# Patient Record
Sex: FEMALE | Race: WHITE | NOT HISPANIC OR LATINO | Employment: OTHER | URBAN - METROPOLITAN AREA
[De-identification: names, ages, dates, MRNs, and addresses within clinical notes are randomized per-mention and may not be internally consistent; named-entity substitution may affect disease eponyms.]

---

## 2023-11-30 ENCOUNTER — OFFICE VISIT (OUTPATIENT)
Dept: PRIMARY CARE | Facility: CLINIC | Age: 88
End: 2023-11-30
Payer: MEDICARE

## 2023-11-30 VITALS
SYSTOLIC BLOOD PRESSURE: 108 MMHG | RESPIRATION RATE: 18 BRPM | HEIGHT: 60 IN | DIASTOLIC BLOOD PRESSURE: 76 MMHG | BODY MASS INDEX: 22.97 KG/M2 | WEIGHT: 117 LBS

## 2023-11-30 DIAGNOSIS — Z00.00 ANNUAL PHYSICAL EXAM: ICD-10-CM

## 2023-11-30 DIAGNOSIS — H61.23 HEARING LOSS OF BOTH EARS DUE TO CERUMEN IMPACTION: Primary | ICD-10-CM

## 2023-11-30 DIAGNOSIS — Z23 FLU VACCINE NEED: ICD-10-CM

## 2023-11-30 DIAGNOSIS — R54 ADVANCED AGE: ICD-10-CM

## 2023-11-30 DIAGNOSIS — H65.93 BILATERAL NON-SUPPURATIVE OTITIS MEDIA: ICD-10-CM

## 2023-11-30 PROBLEM — S81.802A WOUND OF LEFT LEG: Status: ACTIVE | Noted: 2023-11-30

## 2023-11-30 PROBLEM — S69.91XA WRIST INJURY, RIGHT, INITIAL ENCOUNTER: Status: ACTIVE | Noted: 2023-11-30

## 2023-11-30 PROBLEM — R68.89 FORGETFULNESS: Status: ACTIVE | Noted: 2023-11-30

## 2023-11-30 PROCEDURE — 69210 REMOVE IMPACTED EAR WAX UNI: CPT | Performed by: INTERNAL MEDICINE

## 2023-11-30 PROCEDURE — 99214 OFFICE O/P EST MOD 30 MIN: CPT | Performed by: INTERNAL MEDICINE

## 2023-11-30 RX ORDER — LEVOTHYROXINE SODIUM 75 UG/1
TABLET ORAL
COMMUNITY
Start: 2022-06-29

## 2023-11-30 RX ORDER — AMOXICILLIN AND CLAVULANATE POTASSIUM 875; 125 MG/1; MG/1
875 TABLET, FILM COATED ORAL 2 TIMES DAILY
Qty: 14 TABLET | Refills: 0 | Status: SHIPPED | OUTPATIENT
Start: 2023-11-30 | End: 2023-12-07

## 2023-11-30 RX ORDER — LIDOCAINE 50 MG/G
1 PATCH TOPICAL DAILY
COMMUNITY
Start: 2022-02-11 | End: 2024-01-11 | Stop reason: SDUPTHER

## 2023-11-30 RX ORDER — ACETAMINOPHEN 325 MG/1
TABLET ORAL EVERY 6 HOURS PRN
COMMUNITY
Start: 2022-02-11

## 2023-11-30 ASSESSMENT — PATIENT HEALTH QUESTIONNAIRE - PHQ9
1. LITTLE INTEREST OR PLEASURE IN DOING THINGS: NOT AT ALL
2. FEELING DOWN, DEPRESSED OR HOPELESS: NOT AT ALL
SUM OF ALL RESPONSES TO PHQ9 QUESTIONS 1 & 2: 0

## 2023-11-30 ASSESSMENT — ENCOUNTER SYMPTOMS
LOSS OF SENSATION IN FEET: 1
OCCASIONAL FEELINGS OF UNSTEADINESS: 1

## 2023-12-22 NOTE — PROGRESS NOTES
Subjective   Tracey Viera is a 89 y.o. female who presents for Cerumen Impaction (EAR FLUSH ).  Patient presents for cerumen impaction.  Her ears were flushed in the office today.  She tolerated this well.  No other acute issues or complaints.        Objective     /76 (BP Location: Right arm, Patient Position: Sitting, BP Cuff Size: Adult)   Resp 18   Ht 1.524 m (5')   Wt 53.1 kg (117 lb)   BMI 22.85 kg/m²      Physical Exam  Constitutional:       Comments: Frail elderly female   HENT:      Head: Normocephalic and atraumatic.      Nose: Nose normal.      Mouth/Throat:      Mouth: Mucous membranes are moist.      Pharynx: Oropharynx is clear.   Eyes:      Extraocular Movements: Extraocular movements intact.      Pupils: Pupils are equal, round, and reactive to light.   Cardiovascular:      Rate and Rhythm: Normal rate and regular rhythm.   Pulmonary:      Effort: No respiratory distress.      Breath sounds: Normal breath sounds. No wheezing, rhonchi or rales.   Abdominal:      General: Bowel sounds are normal. There is no distension.      Palpations: Abdomen is soft.      Tenderness: There is no abdominal tenderness. There is no guarding.   Musculoskeletal:      Right lower leg: No edema.      Left lower leg: No edema.   Skin:     General: Skin is warm and dry.   Neurological:      Mental Status: She is alert and oriented to person, place, and time. Mental status is at baseline.   Psychiatric:         Mood and Affect: Mood normal.         Behavior: Behavior normal.         Assessment/Plan   Problem List Items Addressed This Visit       Advanced age     Other Visit Diagnoses       Hearing loss of both ears due to cerumen impaction    -  Primary    Relevant Medications    carbamide peroxide (Debrox) 6.5 % otic solution    Annual physical exam        Relevant Orders    Lipid panel    TSH    Flu vaccine need        Bilateral non-suppurative otitis media              Debrox as needed  Augmentin for otitis  media       Francisco Washington, DO

## 2024-01-11 ENCOUNTER — HOME HEALTH ADMISSION (OUTPATIENT)
Dept: HOME HEALTH SERVICES | Facility: HOME HEALTH | Age: 89
End: 2024-01-11
Payer: MEDICARE

## 2024-01-11 ENCOUNTER — OFFICE VISIT (OUTPATIENT)
Dept: PRIMARY CARE | Facility: CLINIC | Age: 89
End: 2024-01-11
Payer: MEDICARE

## 2024-01-11 VITALS
WEIGHT: 123 LBS | RESPIRATION RATE: 18 BRPM | DIASTOLIC BLOOD PRESSURE: 80 MMHG | HEART RATE: 74 BPM | HEIGHT: 60 IN | OXYGEN SATURATION: 95 % | SYSTOLIC BLOOD PRESSURE: 124 MMHG | BODY MASS INDEX: 24.15 KG/M2

## 2024-01-11 DIAGNOSIS — I10 ESSENTIAL HYPERTENSION: ICD-10-CM

## 2024-01-11 DIAGNOSIS — M76.32 ILIOTIBIAL BAND SYNDROME OF LEFT SIDE: Primary | ICD-10-CM

## 2024-01-11 PROBLEM — C67.9 BLADDER CANCER (MULTI): Status: ACTIVE | Noted: 2019-06-10

## 2024-01-11 PROBLEM — S52.022A CLOSED FRACTURE OF LEFT OLECRANON PROCESS: Status: ACTIVE | Noted: 2023-06-17

## 2024-01-11 PROBLEM — E78.5 HYPERLIPIDEMIA LDL GOAL <100: Status: ACTIVE | Noted: 2024-01-11

## 2024-01-11 PROBLEM — I35.0 NONRHEUMATIC AORTIC VALVE STENOSIS: Status: ACTIVE | Noted: 2019-06-10

## 2024-01-11 PROBLEM — R53.1 WEAKNESS: Status: ACTIVE | Noted: 2018-08-21

## 2024-01-11 PROBLEM — K57.30 DIVERTICULOSIS OF COLON: Status: ACTIVE | Noted: 2024-01-11

## 2024-01-11 PROBLEM — I35.1 MILD AORTIC INSUFFICIENCY: Status: ACTIVE | Noted: 2024-01-11

## 2024-01-11 PROBLEM — L57.0 AK (ACTINIC KERATOSIS): Status: ACTIVE | Noted: 2024-01-11

## 2024-01-11 PROBLEM — F17.210 CIGARETTE SMOKER ONE HALF PACK A DAY OR LESS: Status: ACTIVE | Noted: 2024-01-11

## 2024-01-11 PROBLEM — R26.89 BALANCE PROBLEM: Status: ACTIVE | Noted: 2018-08-21

## 2024-01-11 PROCEDURE — 3074F SYST BP LT 130 MM HG: CPT | Performed by: INTERNAL MEDICINE

## 2024-01-11 PROCEDURE — 1159F MED LIST DOCD IN RCRD: CPT | Performed by: INTERNAL MEDICINE

## 2024-01-11 PROCEDURE — 1125F AMNT PAIN NOTED PAIN PRSNT: CPT | Performed by: INTERNAL MEDICINE

## 2024-01-11 PROCEDURE — 99214 OFFICE O/P EST MOD 30 MIN: CPT | Performed by: INTERNAL MEDICINE

## 2024-01-11 PROCEDURE — 3079F DIAST BP 80-89 MM HG: CPT | Performed by: INTERNAL MEDICINE

## 2024-01-11 RX ORDER — CEFDINIR 300 MG/1
300 CAPSULE ORAL EVERY 12 HOURS
COMMUNITY
Start: 2024-01-07

## 2024-01-11 RX ORDER — IPRATROPIUM BROMIDE AND ALBUTEROL SULFATE 2.5; .5 MG/3ML; MG/3ML
SOLUTION RESPIRATORY (INHALATION)
COMMUNITY
Start: 2023-12-30

## 2024-01-11 RX ORDER — MULTIVITAMIN WITH IRON
TABLET ORAL
COMMUNITY
Start: 2005-03-01

## 2024-01-11 RX ORDER — OMEPRAZOLE 40 MG/1
40 CAPSULE, DELAYED RELEASE ORAL
COMMUNITY
Start: 2021-07-23

## 2024-01-11 RX ORDER — OLIVE OIL
OIL (ML) MISCELLANEOUS
COMMUNITY
Start: 2023-12-31

## 2024-01-11 RX ORDER — LIDOCAINE 50 MG/G
1 PATCH TOPICAL DAILY
Qty: 30 PATCH | Refills: 3 | Status: SHIPPED | OUTPATIENT
Start: 2024-01-11 | End: 2024-01-16 | Stop reason: SDUPTHER

## 2024-01-11 RX ORDER — FOSINOPRIL SODIUM 20 MG/1
20 TABLET ORAL
COMMUNITY
Start: 2021-02-08

## 2024-01-11 ASSESSMENT — PAIN SCALES - GENERAL: PAINLEVEL: 2

## 2024-01-11 NOTE — PROGRESS NOTES
Subjective   Tracey Viera is a 89 y.o. female who presents for Follow-up.  Patient presents for follow-up of pneumonia.  She presented to urgent care with fever around Sh[place] time and was diagnosed with pneumonia.  She received IV Rocephin on back-to-back days and was prescribed p.o. antibiotics.  She still has a few pills left.  Today, she denies cough, fever, chills.  Patient accompanied by friend she states that patient's hearing has been better since her ears were flushed.  Ears examined again today.  Patient does have cerumen bilaterally but no difficulty hearing below baseline.  Patient does complain of pain and weakness on her lateral left quad.  She points down her IT band.  Is tight on exam.  Advised that she would benefit from physical therapy.  Patient is unable to get out of her house due to age, debility, and ambulatory dysfunction.  She would benefit from home health care for physical therapy.        Objective     /80 (BP Location: Right arm, Patient Position: Sitting, BP Cuff Size: Adult)   Pulse 74   Resp 18   Ht 1.524 m (5')   Wt 55.8 kg (123 lb)   SpO2 95%   BMI 24.02 kg/m²      Physical Exam  Constitutional:       Comments: Frail elderly female, hard of hearing   HENT:      Head: Normocephalic and atraumatic.      Nose: Nose normal.      Mouth/Throat:      Mouth: Mucous membranes are moist.      Pharynx: Oropharynx is clear.   Eyes:      Extraocular Movements: Extraocular movements intact.      Pupils: Pupils are equal, round, and reactive to light.   Cardiovascular:      Rate and Rhythm: Normal rate and regular rhythm.   Pulmonary:      Effort: No respiratory distress.      Breath sounds: Normal breath sounds. No wheezing, rhonchi or rales.   Abdominal:      General: Bowel sounds are normal. There is no distension.      Palpations: Abdomen is soft.      Tenderness: There is no abdominal tenderness. There is no guarding.   Musculoskeletal:      Right lower leg: No edema.       Left lower leg: No edema.      Comments: Mild tenderness along the left IT band   Skin:     General: Skin is warm and dry.   Neurological:      Mental Status: She is alert and oriented to person, place, and time. Mental status is at baseline.   Psychiatric:         Mood and Affect: Mood normal.         Behavior: Behavior normal.         Assessment/Plan   Problem List Items Addressed This Visit       Essential hypertension     Continue current medications  Blood pressure at goal          Other Visit Diagnoses       Iliotibial band syndrome of left side    -  Primary    Relevant Medications    lidocaine (Lidoderm) 5 % patch    Other Relevant Orders    Referral to Home Care          Complete antibiotic course  Lidoderm patch and physical therapy for IT band syndrome  Return for an ear flush in the near future       Francisco Washington, DO

## 2024-01-12 ENCOUNTER — DOCUMENTATION (OUTPATIENT)
Dept: HOME HEALTH SERVICES | Facility: HOME HEALTH | Age: 89
End: 2024-01-12
Payer: MEDICARE

## 2024-01-12 NOTE — HH CARE COORDINATION
Home Care received a Referral for Physical Therapy and Occupational Therapy. We have processed the referral for a Start of Care on 1/12/24 1-2 days.     If you have any questions or concerns, please feel free to contact us at [phone]. Follow the prompts, enter your five digit zip code, and you will be directed to your care team on [facility].

## 2024-01-15 ENCOUNTER — HOME CARE VISIT (OUTPATIENT)
Dept: HOME HEALTH SERVICES | Facility: HOME HEALTH | Age: 89
End: 2024-01-15
Payer: MEDICARE

## 2024-01-15 PROCEDURE — 169592 NO-PAY CLAIM PROCEDURE

## 2024-01-15 PROCEDURE — 1090000002 HH PPS REVENUE DEBIT

## 2024-01-15 PROCEDURE — 0023 HH SOC

## 2024-01-15 PROCEDURE — 1090000001 HH PPS REVENUE CREDIT

## 2024-01-15 PROCEDURE — G0151 HHCP-SERV OF PT,EA 15 MIN: HCPCS | Mod: HHH

## 2024-01-15 ASSESSMENT — ENCOUNTER SYMPTOMS
LOWEST PAIN SEVERITY IN PAST 24 HOURS: 0/10
SUBJECTIVE PAIN PROGRESSION: WAXING AND WANING
PAIN: 1
PAIN SEVERITY GOAL: 0/10
PERSON REPORTING PAIN: PATIENT
HIGHEST PAIN SEVERITY IN PAST 24 HOURS: 6/10
PAIN LOCATION: RIGHT HIP
PAIN LOCATION: LEFT HIP

## 2024-01-15 ASSESSMENT — ACTIVITIES OF DAILY LIVING (ADL)
OASIS_M1830: 03
ENTERING_EXITING_HOME: MINIMUM ASSIST
AMBULATION ASSISTANCE ON FLAT SURFACES: 1

## 2024-01-16 ENCOUNTER — HOME CARE VISIT (OUTPATIENT)
Dept: HOME HEALTH SERVICES | Facility: HOME HEALTH | Age: 89
End: 2024-01-16
Payer: MEDICARE

## 2024-01-16 DIAGNOSIS — M76.32 ILIOTIBIAL BAND SYNDROME OF LEFT SIDE: ICD-10-CM

## 2024-01-16 PROCEDURE — 1090000002 HH PPS REVENUE DEBIT

## 2024-01-16 PROCEDURE — 1090000001 HH PPS REVENUE CREDIT

## 2024-01-16 RX ORDER — LIDOCAINE 50 MG/G
1 PATCH TOPICAL DAILY
Qty: 30 PATCH | Refills: 3 | Status: SHIPPED | OUTPATIENT
Start: 2024-01-16 | End: 2024-01-22 | Stop reason: SDUPTHER

## 2024-01-16 NOTE — HOME HEALTH
PAtient referred for OTHH services by MD due to functional decline. PMH:illiotibial band syndrome, bladder CA, elbow fracture.

## 2024-01-16 NOTE — Clinical Note
showed up to home for schedule visit with POA requesting therapist return another time due to patient still asleep.

## 2024-01-17 ENCOUNTER — TELEPHONE (OUTPATIENT)
Dept: PRIMARY CARE | Facility: CLINIC | Age: 89
End: 2024-01-17
Payer: MEDICARE

## 2024-01-17 ENCOUNTER — HOME CARE VISIT (OUTPATIENT)
Dept: HOME HEALTH SERVICES | Facility: HOME HEALTH | Age: 89
End: 2024-01-17
Payer: MEDICARE

## 2024-01-17 PROCEDURE — 1090000002 HH PPS REVENUE DEBIT

## 2024-01-17 PROCEDURE — 1090000001 HH PPS REVENUE CREDIT

## 2024-01-17 NOTE — TELEPHONE ENCOUNTER
Pt left voicemail concerning needing pre-authorization for prescribed lidocaine patches. Pre- Auth noted to be pending at this time. No questions sent to this office to answer at this time.

## 2024-01-18 ENCOUNTER — HOME CARE VISIT (OUTPATIENT)
Dept: HOME HEALTH SERVICES | Facility: HOME HEALTH | Age: 89
End: 2024-01-18
Payer: MEDICARE

## 2024-01-18 PROCEDURE — 1090000002 HH PPS REVENUE DEBIT

## 2024-01-18 PROCEDURE — G0157 HHC PT ASSISTANT EA 15: HCPCS | Mod: HHH

## 2024-01-18 PROCEDURE — 1090000001 HH PPS REVENUE CREDIT

## 2024-01-18 ASSESSMENT — ENCOUNTER SYMPTOMS
PAIN SEVERITY GOAL: 0/10
PAIN LOCATION: RIGHT LEG
PAIN: 1
HIGHEST PAIN SEVERITY IN PAST 24 HOURS: 5/10
SUBJECTIVE PAIN PROGRESSION: WAXING AND WANING
LOWEST PAIN SEVERITY IN PAST 24 HOURS: 5/10

## 2024-01-19 ENCOUNTER — HOME CARE VISIT (OUTPATIENT)
Dept: HOME HEALTH SERVICES | Facility: HOME HEALTH | Age: 89
End: 2024-01-19
Payer: MEDICARE

## 2024-01-19 PROCEDURE — 1090000002 HH PPS REVENUE DEBIT

## 2024-01-19 PROCEDURE — G0152 HHCP-SERV OF OT,EA 15 MIN: HCPCS | Mod: HHH

## 2024-01-19 PROCEDURE — 1090000001 HH PPS REVENUE CREDIT

## 2024-01-19 ASSESSMENT — ENCOUNTER SYMPTOMS: PERSON REPORTING PAIN: PATIENT

## 2024-01-19 ASSESSMENT — ACTIVITIES OF DAILY LIVING (ADL)
BATHING_CURRENT_FUNCTION: MINIMUM ASSIST
PREPARING MEALS: NEEDS ASSISTANCE
TOILETING: 1
DRESSING_LB_CURRENT_FUNCTION: MINIMUM ASSIST
TOILETING: STAND BY ASSIST
BATHING ASSESSED: 1

## 2024-01-20 PROCEDURE — 1090000001 HH PPS REVENUE CREDIT

## 2024-01-20 PROCEDURE — 1090000002 HH PPS REVENUE DEBIT

## 2024-01-20 NOTE — HOME HEALTH
Patient referred for OTHH services by MD with decreased functional task completion. Patient lives next door to neighbor who checks in on patient/POA. Private HHA to assist as needed during the day 7 days a week. patient previously supervision to min A for functional task completion at walker level. Tub shower on 2nd floor with stool  Patient currently CGA to mod A for functional taks completion with sfety cues.   PMH:illiotibial band syndrome, HTN PAtient to benefit from additional OT services to further address safe task completion

## 2024-01-21 PROCEDURE — 1090000001 HH PPS REVENUE CREDIT

## 2024-01-21 PROCEDURE — 1090000002 HH PPS REVENUE DEBIT

## 2024-01-21 ASSESSMENT — ENCOUNTER SYMPTOMS: PERSON REPORTING PAIN: PATIENT

## 2024-01-22 DIAGNOSIS — M76.32 ILIOTIBIAL BAND SYNDROME OF LEFT SIDE: ICD-10-CM

## 2024-01-22 PROCEDURE — 1090000001 HH PPS REVENUE CREDIT

## 2024-01-22 PROCEDURE — 1090000002 HH PPS REVENUE DEBIT

## 2024-01-23 ENCOUNTER — HOME CARE VISIT (OUTPATIENT)
Dept: HOME HEALTH SERVICES | Facility: HOME HEALTH | Age: 89
End: 2024-01-23
Payer: MEDICARE

## 2024-01-23 PROCEDURE — 1090000002 HH PPS REVENUE DEBIT

## 2024-01-23 PROCEDURE — 1090000001 HH PPS REVENUE CREDIT

## 2024-01-23 PROCEDURE — G0157 HHC PT ASSISTANT EA 15: HCPCS | Mod: HHH

## 2024-01-23 RX ORDER — LIDOCAINE 50 MG/G
1 PATCH TOPICAL DAILY
Qty: 30 PATCH | Refills: 3 | Status: SHIPPED | OUTPATIENT
Start: 2024-01-23

## 2024-01-23 ASSESSMENT — ENCOUNTER SYMPTOMS
DENIES PAIN: 1
PERSON REPORTING PAIN: PATIENT

## 2024-01-24 ENCOUNTER — HOME CARE VISIT (OUTPATIENT)
Dept: HOME HEALTH SERVICES | Facility: HOME HEALTH | Age: 89
End: 2024-01-24
Payer: MEDICARE

## 2024-01-24 VITALS
DIASTOLIC BLOOD PRESSURE: 60 MMHG | TEMPERATURE: 97.1 F | HEART RATE: 56 BPM | OXYGEN SATURATION: 100 % | SYSTOLIC BLOOD PRESSURE: 120 MMHG

## 2024-01-24 PROCEDURE — G0152 HHCP-SERV OF OT,EA 15 MIN: HCPCS | Mod: HHH

## 2024-01-24 PROCEDURE — 1090000001 HH PPS REVENUE CREDIT

## 2024-01-24 PROCEDURE — 1090000002 HH PPS REVENUE DEBIT

## 2024-01-24 ASSESSMENT — ENCOUNTER SYMPTOMS
DENIES PAIN: 1
PERSON REPORTING PAIN: PATIENT

## 2024-01-25 ENCOUNTER — HOME CARE VISIT (OUTPATIENT)
Dept: HOME HEALTH SERVICES | Facility: HOME HEALTH | Age: 89
End: 2024-01-25
Payer: MEDICARE

## 2024-01-25 PROCEDURE — 1090000001 HH PPS REVENUE CREDIT

## 2024-01-25 PROCEDURE — 1090000002 HH PPS REVENUE DEBIT

## 2024-01-25 PROCEDURE — G0157 HHC PT ASSISTANT EA 15: HCPCS | Mod: HHH

## 2024-01-25 ASSESSMENT — ENCOUNTER SYMPTOMS
PERSON REPORTING PAIN: PATIENT
DENIES PAIN: 1

## 2024-01-26 PROCEDURE — 1090000001 HH PPS REVENUE CREDIT

## 2024-01-26 PROCEDURE — 1090000002 HH PPS REVENUE DEBIT

## 2024-01-27 PROCEDURE — 1090000002 HH PPS REVENUE DEBIT

## 2024-01-27 PROCEDURE — 1090000001 HH PPS REVENUE CREDIT

## 2024-01-28 PROCEDURE — 1090000001 HH PPS REVENUE CREDIT

## 2024-01-28 PROCEDURE — 1090000002 HH PPS REVENUE DEBIT

## 2024-01-29 PROCEDURE — 1090000001 HH PPS REVENUE CREDIT

## 2024-01-29 PROCEDURE — 1090000002 HH PPS REVENUE DEBIT

## 2024-01-30 ENCOUNTER — HOME CARE VISIT (OUTPATIENT)
Dept: HOME HEALTH SERVICES | Facility: HOME HEALTH | Age: 89
End: 2024-01-30
Payer: MEDICARE

## 2024-01-30 PROCEDURE — 1090000002 HH PPS REVENUE DEBIT

## 2024-01-30 PROCEDURE — G0157 HHC PT ASSISTANT EA 15: HCPCS | Mod: HHH

## 2024-01-30 PROCEDURE — 1090000001 HH PPS REVENUE CREDIT

## 2024-01-30 ASSESSMENT — ENCOUNTER SYMPTOMS
DENIES PAIN: 1
PERSON REPORTING PAIN: PATIENT

## 2024-01-31 ENCOUNTER — HOME CARE VISIT (OUTPATIENT)
Dept: HOME HEALTH SERVICES | Facility: HOME HEALTH | Age: 89
End: 2024-01-31
Payer: MEDICARE

## 2024-01-31 VITALS
TEMPERATURE: 96.9 F | HEART RATE: 61 BPM | OXYGEN SATURATION: 97 % | SYSTOLIC BLOOD PRESSURE: 120 MMHG | DIASTOLIC BLOOD PRESSURE: 70 MMHG

## 2024-01-31 PROCEDURE — G0152 HHCP-SERV OF OT,EA 15 MIN: HCPCS | Mod: HHH

## 2024-01-31 PROCEDURE — 1090000001 HH PPS REVENUE CREDIT

## 2024-01-31 PROCEDURE — 1090000002 HH PPS REVENUE DEBIT

## 2024-02-01 PROCEDURE — 1090000001 HH PPS REVENUE CREDIT

## 2024-02-01 PROCEDURE — 1090000002 HH PPS REVENUE DEBIT

## 2024-02-02 ENCOUNTER — APPOINTMENT (OUTPATIENT)
Dept: HOME HEALTH SERVICES | Facility: HOME HEALTH | Age: 89
End: 2024-02-02
Payer: MEDICARE

## 2024-02-02 PROCEDURE — 1090000001 HH PPS REVENUE CREDIT

## 2024-02-02 PROCEDURE — 1090000002 HH PPS REVENUE DEBIT

## 2024-02-03 PROCEDURE — 1090000001 HH PPS REVENUE CREDIT

## 2024-02-03 PROCEDURE — 1090000002 HH PPS REVENUE DEBIT

## 2024-02-04 PROCEDURE — 1090000002 HH PPS REVENUE DEBIT

## 2024-02-04 PROCEDURE — 1090000001 HH PPS REVENUE CREDIT

## 2024-02-05 ENCOUNTER — HOME CARE VISIT (OUTPATIENT)
Dept: HOME HEALTH SERVICES | Facility: HOME HEALTH | Age: 89
End: 2024-02-05
Payer: MEDICARE

## 2024-02-05 PROCEDURE — G0151 HHCP-SERV OF PT,EA 15 MIN: HCPCS | Mod: HHH

## 2024-02-05 PROCEDURE — 1090000002 HH PPS REVENUE DEBIT

## 2024-02-05 PROCEDURE — 1090000001 HH PPS REVENUE CREDIT

## 2024-02-05 ASSESSMENT — ENCOUNTER SYMPTOMS
PAIN SEVERITY GOAL: 0/10
HIGHEST PAIN SEVERITY IN PAST 24 HOURS: 0/10
PERSON REPORTING PAIN: PATIENT
SUBJECTIVE PAIN PROGRESSION: RESOLVED
DENIES PAIN: 1
LOWEST PAIN SEVERITY IN PAST 24 HOURS: 0/10

## 2024-02-05 ASSESSMENT — ACTIVITIES OF DAILY LIVING (ADL)
OASIS_M1830: 03
HOME_HEALTH_OASIS: 01

## 2024-04-25 ENCOUNTER — OFFICE VISIT (OUTPATIENT)
Dept: PRIMARY CARE | Facility: CLINIC | Age: 89
End: 2024-04-25
Payer: MEDICARE

## 2024-04-25 VITALS
DIASTOLIC BLOOD PRESSURE: 64 MMHG | RESPIRATION RATE: 18 BRPM | SYSTOLIC BLOOD PRESSURE: 116 MMHG | HEIGHT: 60 IN | WEIGHT: 132 LBS | BODY MASS INDEX: 25.91 KG/M2 | OXYGEN SATURATION: 96 % | HEART RATE: 66 BPM

## 2024-04-25 DIAGNOSIS — R60.0 EDEMA OF BOTH LEGS: Primary | ICD-10-CM

## 2024-04-25 DIAGNOSIS — B37.89 CANDIDA RASH OF GROIN: ICD-10-CM

## 2024-04-25 DIAGNOSIS — R54 ADVANCED AGE: ICD-10-CM

## 2024-04-25 DIAGNOSIS — I10 ESSENTIAL HYPERTENSION: ICD-10-CM

## 2024-04-25 PROCEDURE — 1125F AMNT PAIN NOTED PAIN PRSNT: CPT | Performed by: INTERNAL MEDICINE

## 2024-04-25 PROCEDURE — 99214 OFFICE O/P EST MOD 30 MIN: CPT | Performed by: INTERNAL MEDICINE

## 2024-04-25 PROCEDURE — 1159F MED LIST DOCD IN RCRD: CPT | Performed by: INTERNAL MEDICINE

## 2024-04-25 PROCEDURE — 3078F DIAST BP <80 MM HG: CPT | Performed by: INTERNAL MEDICINE

## 2024-04-25 PROCEDURE — 3074F SYST BP LT 130 MM HG: CPT | Performed by: INTERNAL MEDICINE

## 2024-04-25 RX ORDER — FUROSEMIDE 20 MG/1
10 TABLET ORAL DAILY PRN
Qty: 15 TABLET | Refills: 0 | Status: SHIPPED | OUTPATIENT
Start: 2024-04-25 | End: 2024-05-03 | Stop reason: SDUPTHER

## 2024-04-25 RX ORDER — NYSTATIN 100000 [USP'U]/G
1 POWDER TOPICAL 2 TIMES DAILY
Qty: 60 G | Refills: 1 | Status: SHIPPED | OUTPATIENT
Start: 2024-04-25 | End: 2025-04-25

## 2024-04-25 ASSESSMENT — PAIN SCALES - GENERAL: PAINLEVEL: 4

## 2024-04-25 NOTE — PROGRESS NOTES
Subjective   Patient ID: Tracey Viera is a 89 y.o. female who presents for No chief complaint on file..    Patient presents for multiple complaints.  Accompanied by family member.  She complains of swelling in her legs and feet.  She has been on diuretics in the past but is not on one now.  She does not wear compression stockings due to lower extremity pain and thin skin.  Patient has a home health aide who told patient to get nystatin for intertrigo.  Family member inquires about vitamins.  Family member does not think that patient needs vitamin supplements.  Advised that patient does not need supplements if she has a balanced diet.             Objective   /64 (BP Location: Left arm, Patient Position: Sitting, BP Cuff Size: Adult)   Pulse 66   Resp 18   Ht 1.524 m (5')   Wt 59.9 kg (132 lb)   SpO2 96%   BMI 25.78 kg/m²     Physical Exam  Constitutional:       Comments: Frail elderly female, hard of hearing   HENT:      Head: Normocephalic and atraumatic.      Nose: Nose normal.      Mouth/Throat:      Mouth: Mucous membranes are moist.      Pharynx: Oropharynx is clear.   Eyes:      Extraocular Movements: Extraocular movements intact.      Pupils: Pupils are equal, round, and reactive to light.   Cardiovascular:      Rate and Rhythm: Normal rate and regular rhythm.   Pulmonary:      Effort: No respiratory distress.      Breath sounds: Normal breath sounds. No wheezing, rhonchi or rales.   Abdominal:      General: Bowel sounds are normal. There is no distension.      Palpations: Abdomen is soft.      Tenderness: There is no abdominal tenderness. There is no guarding.   Musculoskeletal:      Right lower leg: Edema present.      Left lower leg: Edema present.   Skin:     General: Skin is warm and dry.   Neurological:      Mental Status: She is alert and oriented to person, place, and time. Mental status is at baseline.   Psychiatric:         Mood and Affect: Mood normal.         Behavior: Behavior  normal.         Assessment/Plan   Problem List Items Addressed This Visit             ICD-10-CM    Advanced age R54     Conservative management         Essential hypertension I10     Continue current medications          Other Visit Diagnoses         Codes    Edema of both legs    -  Primary R60.0    Relevant Medications    furosemide (Lasix) 20 mg tablet    Candida rash of groin     B37.89    Relevant Medications    nystatin (Mycostatin) 100,000 unit/gram powder          Nystatin powder  Lasix to use as needed  Return in 6 months

## 2024-05-03 DIAGNOSIS — R60.0 EDEMA OF BOTH LEGS: ICD-10-CM

## 2024-05-03 RX ORDER — FUROSEMIDE 20 MG/1
20 TABLET ORAL DAILY PRN
Qty: 7 TABLET | Refills: 0 | Status: SHIPPED | OUTPATIENT
Start: 2024-05-03 | End: 2024-05-10

## 2024-06-27 DIAGNOSIS — M85.80 OSTEOPENIA, UNSPECIFIED LOCATION: ICD-10-CM

## 2024-06-27 DIAGNOSIS — R54 ADVANCED AGE: ICD-10-CM

## 2024-06-27 DIAGNOSIS — S69.91XA WRIST INJURY, RIGHT, INITIAL ENCOUNTER: ICD-10-CM

## 2024-06-27 DIAGNOSIS — R53.1 WEAKNESS: ICD-10-CM

## 2024-06-27 DIAGNOSIS — I35.1 MILD AORTIC INSUFFICIENCY: ICD-10-CM

## 2024-06-27 DIAGNOSIS — S81.802S WOUND OF LEFT LOWER EXTREMITY, SEQUELA: ICD-10-CM

## 2024-06-27 DIAGNOSIS — I35.0 NONRHEUMATIC AORTIC VALVE STENOSIS: ICD-10-CM

## 2024-06-27 RX ORDER — FUROSEMIDE 20 MG/1
20 TABLET ORAL DAILY PRN
Qty: 30 TABLET | Refills: 11 | Status: SHIPPED | OUTPATIENT
Start: 2024-06-27 | End: 2025-06-27

## 2024-07-30 DIAGNOSIS — E03.9 HYPOTHYROIDISM, UNSPECIFIED TYPE: ICD-10-CM

## 2024-07-30 RX ORDER — LEVOTHYROXINE SODIUM 100 UG/1
100 TABLET ORAL
Qty: 90 TABLET | Refills: 3 | Status: SHIPPED | OUTPATIENT
Start: 2024-07-30 | End: 2025-07-25

## 2024-07-30 RX ORDER — LEVOTHYROXINE SODIUM 100 UG/1
100 TABLET ORAL
Qty: 90 TABLET | Refills: 3 | Status: SHIPPED | OUTPATIENT
Start: 2024-07-30 | End: 2024-07-30 | Stop reason: SDUPTHER

## 2024-07-30 RX ORDER — LEVOTHYROXINE SODIUM 100 UG/1
100 TABLET ORAL
COMMUNITY
Start: 2024-04-07 | End: 2024-07-30 | Stop reason: SDUPTHER

## 2024-10-15 ENCOUNTER — APPOINTMENT (OUTPATIENT)
Dept: PRIMARY CARE | Facility: CLINIC | Age: 89
End: 2024-10-15
Payer: MEDICARE

## 2024-10-15 VITALS
HEIGHT: 60 IN | RESPIRATION RATE: 16 BRPM | OXYGEN SATURATION: 94 % | SYSTOLIC BLOOD PRESSURE: 110 MMHG | DIASTOLIC BLOOD PRESSURE: 64 MMHG | WEIGHT: 130 LBS | BODY MASS INDEX: 25.52 KG/M2 | HEART RATE: 70 BPM

## 2024-10-15 DIAGNOSIS — H61.23 BILATERAL IMPACTED CERUMEN: ICD-10-CM

## 2024-10-15 DIAGNOSIS — H91.93 DECREASED HEARING OF BOTH EARS: ICD-10-CM
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
## 2024-10-15 DIAGNOSIS — B37.89 CANDIDA RASH OF GROIN: ICD-10-CM

## 2024-10-15 PROCEDURE — 1126F AMNT PAIN NOTED NONE PRSNT: CPT | Performed by: INTERNAL MEDICINE

## 2024-10-15 PROCEDURE — 99214 OFFICE O/P EST MOD 30 MIN: CPT | Performed by: INTERNAL MEDICINE

## 2024-10-15 PROCEDURE — 1036F TOBACCO NON-USER: CPT | Performed by: INTERNAL MEDICINE

## 2024-10-15 PROCEDURE — 3078F DIAST BP <80 MM HG: CPT | Performed by: INTERNAL MEDICINE

## 2024-10-15 PROCEDURE — 3074F SYST BP LT 130 MM HG: CPT | Performed by: INTERNAL MEDICINE

## 2024-10-15 RX ORDER — AMOXICILLIN 500 MG/1
500 CAPSULE ORAL EVERY 12 HOURS SCHEDULED
Qty: 14 CAPSULE | Refills: 1 | Status: SHIPPED | OUTPATIENT
Start: 2024-10-15 | End: 2024-10-29

## 2024-10-15 RX ORDER — NYSTATIN 100000 [USP'U]/G
1 POWDER TOPICAL 2 TIMES DAILY
Qty: 60 G | Refills: 3 | Status: SHIPPED | OUTPATIENT
Start: 2024-10-15 | End: 2025-10-15

## 2024-10-15 ASSESSMENT — PAIN SCALES - GENERAL: PAINLEVEL: 0-NO PAIN

## 2024-10-16 NOTE — PROGRESS NOTES
Subjective   Tracey Viera is a 89 y.o. female who presents for Follow-up.  Patient presents for follow-up.  Accompanied by family member.  Patient is extremely hard of hearing.  She has no acute complaints.  Family member requests a refill of nystatin.  She also requests an antibiotic to have on hand for UTIs.  Patient has had decreased hearing as well.  Family requests referral to audiology and to ENT for cerumen impaction.        Objective     /64 (BP Location: Left arm, Patient Position: Sitting, BP Cuff Size: Adult)   Pulse 70   Resp 16   Ht 1.524 m (5')   Wt 59 kg (130 lb)   SpO2 94%   BMI 25.39 kg/m²      Physical Exam  Constitutional:       Comments: Frail elderly female, nearly deaf   HENT:      Head: Normocephalic and atraumatic.      Nose: Nose normal.      Mouth/Throat:      Mouth: Mucous membranes are moist.      Pharynx: Oropharynx is clear.   Eyes:      Extraocular Movements: Extraocular movements intact.      Pupils: Pupils are equal, round, and reactive to light.   Cardiovascular:      Rate and Rhythm: Normal rate and regular rhythm.   Pulmonary:      Effort: No respiratory distress.      Breath sounds: Normal breath sounds. No wheezing, rhonchi or rales.   Abdominal:      General: Bowel sounds are normal. There is no distension.      Palpations: Abdomen is soft.      Tenderness: There is no abdominal tenderness. There is no guarding.   Musculoskeletal:      Right lower leg: No edema.      Left lower leg: No edema.   Skin:     General: Skin is warm and dry.   Neurological:      Mental Status: She is alert. Mental status is at baseline.         Assessment/Plan   Problem List Items Addressed This Visit    None  Visit Diagnoses       Cat bite, subsequent encounter    -  Primary    Relevant Medications    amoxicillin (Amoxil) 500 mg capsule    Candida rash of groin        Relevant Medications    nystatin (Mycostatin) 100,000 unit/gram powder    Decreased hearing of both ears         Relevant Orders    Referral to Audiology    Bilateral impacted cerumen        Relevant Orders    Referral to ENT           Follow-up in 6 months       Francisco Washington DO
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
## 2024-10-24 DIAGNOSIS — R26.89 BALANCE PROBLEM: ICD-10-CM

## 2024-10-27 RX ORDER — CALCIUM CARBONATE 160(400)MG
1 TABLET,CHEWABLE ORAL DAILY
Qty: 1 EACH | Refills: 0 | Status: SHIPPED | OUTPATIENT
Start: 2024-10-27

## 2024-11-05 DIAGNOSIS — R53.1 WEAKNESS: ICD-10-CM

## 2024-12-04 ENCOUNTER — CLINICAL SUPPORT (OUTPATIENT)
Dept: AUDIOLOGY | Facility: CLINIC | Age: 89
End: 2024-12-04
Payer: MEDICARE

## 2024-12-04 ENCOUNTER — OFFICE VISIT (OUTPATIENT)
Dept: OTOLARYNGOLOGY | Facility: CLINIC | Age: 89
End: 2024-12-04
Payer: MEDICARE

## 2024-12-04 VITALS — BODY MASS INDEX: 24.74 KG/M2 | TEMPERATURE: 98.3 F | HEIGHT: 60 IN | WEIGHT: 126 LBS

## 2024-12-04 DIAGNOSIS — H91.93 DECREASED HEARING OF BOTH EARS: ICD-10-CM

## 2024-12-04 DIAGNOSIS — H90.3 SENSORINEURAL HEARING LOSS (SNHL), BILATERAL: Primary | ICD-10-CM

## 2024-12-04 DIAGNOSIS — H61.23 BILATERAL IMPACTED CERUMEN: ICD-10-CM

## 2024-12-04 DIAGNOSIS — H90.3 SENSORINEURAL HEARING LOSS (SNHL) OF BOTH EARS: Primary | ICD-10-CM

## 2024-12-04 PROCEDURE — 69210 REMOVE IMPACTED EAR WAX UNI: CPT | Performed by: NURSE PRACTITIONER

## 2024-12-04 PROCEDURE — 99203 OFFICE O/P NEW LOW 30 MIN: CPT | Performed by: NURSE PRACTITIONER

## 2024-12-04 PROCEDURE — 92557 COMPREHENSIVE HEARING TEST: CPT | Performed by: AUDIOLOGIST

## 2024-12-04 PROCEDURE — 99213 OFFICE O/P EST LOW 20 MIN: CPT | Performed by: NURSE PRACTITIONER

## 2024-12-04 PROCEDURE — 1159F MED LIST DOCD IN RCRD: CPT | Performed by: NURSE PRACTITIONER

## 2024-12-04 PROCEDURE — 92550 TYMPANOMETRY & REFLEX THRESH: CPT | Mod: RT | Performed by: AUDIOLOGIST

## 2024-12-04 PROCEDURE — 1126F AMNT PAIN NOTED NONE PRSNT: CPT | Performed by: NURSE PRACTITIONER

## 2024-12-04 PROCEDURE — 69210 REMOVE IMPACTED EAR WAX UNI: CPT | Mod: 50 | Performed by: NURSE PRACTITIONER

## 2024-12-04 SDOH — ECONOMIC STABILITY: FOOD INSECURITY: WITHIN THE PAST 12 MONTHS, THE FOOD YOU BOUGHT JUST DIDN'T LAST AND YOU DIDN'T HAVE MONEY TO GET MORE.: NEVER TRUE

## 2024-12-04 SDOH — ECONOMIC STABILITY: FOOD INSECURITY: WITHIN THE PAST 12 MONTHS, YOU WORRIED THAT YOUR FOOD WOULD RUN OUT BEFORE YOU GOT MONEY TO BUY MORE.: NEVER TRUE

## 2024-12-04 ASSESSMENT — COLUMBIA-SUICIDE SEVERITY RATING SCALE - C-SSRS
2. HAVE YOU ACTUALLY HAD ANY THOUGHTS OF KILLING YOURSELF?: NO
1. IN THE PAST MONTH, HAVE YOU WISHED YOU WERE DEAD OR WISHED YOU COULD GO TO SLEEP AND NOT WAKE UP?: NO
6. HAVE YOU EVER DONE ANYTHING, STARTED TO DO ANYTHING, OR PREPARED TO DO ANYTHING TO END YOUR LIFE?: NO

## 2024-12-04 ASSESSMENT — PATIENT HEALTH QUESTIONNAIRE - PHQ9
SUM OF ALL RESPONSES TO PHQ9 QUESTIONS 1 AND 2: 0
2. FEELING DOWN, DEPRESSED OR HOPELESS: NOT AT ALL
1. LITTLE INTEREST OR PLEASURE IN DOING THINGS: NOT AT ALL

## 2024-12-04 ASSESSMENT — ENCOUNTER SYMPTOMS
LOSS OF SENSATION IN FEET: 0
DEPRESSION: 0
OCCASIONAL FEELINGS OF UNSTEADINESS: 1

## 2024-12-04 ASSESSMENT — LIFESTYLE VARIABLES
SKIP TO QUESTIONS 9-10: 1
HOW OFTEN DO YOU HAVE SIX OR MORE DRINKS ON ONE OCCASION: NEVER
HOW OFTEN DO YOU HAVE A DRINK CONTAINING ALCOHOL: MONTHLY OR LESS
HOW MANY STANDARD DRINKS CONTAINING ALCOHOL DO YOU HAVE ON A TYPICAL DAY: 1 OR 2
AUDIT-C TOTAL SCORE: 1

## 2024-12-04 ASSESSMENT — PAIN SCALES - GENERAL: PAINLEVEL_OUTOF10: 0-NO PAIN

## 2024-12-04 NOTE — PROGRESS NOTES
"AUDIOLOGY ADULT AUDIOMETRIC EVALUATION      Name:  Tracey Viera  :  3/12/1931  Age:  89 y.o.  Date of Evaluation:  2024     HISTORY  Reason for visit:  hearing loss  Ms. Viera is seen 2024 at the request of  Francisco Washington DO for an evaluation of hearing.   Saw Jazmin Moore CNP today    Chief complaint:    Hearing loss    Hearing loss:  bilateral  Tinnitus:   denies   Otitis Media: denies  Otologic surgical history:  denies  Dizziness/imbalance:  denies  Otalgia:  denies  Ear pressure/fullness:  denies  History of excessive noise exposure:  denies  Other: denies    Hearing aid history: none          EVALUATION  Please find audiogram in \"Media\" tab (Document Type:  Audiology Report) or included at the bottom of this note.    RESULTS   Otoscopic Evaluation: clear canals bilaterally      Immittance Measures (226 Hz probe tone):     Right ear:  Tympanometry is consistent with normal middle ear pressure and normal tympanic membrane mobility.     Left ear: Could not test  (seal)      Test technique:  standard behavioral technique via [facility] earphones .  Reliability is good.    Pure Tone Audiometry:  Hearing sensitivity is in the mild to profound hearing loss range bilaterally.       Speech Audiometry:        Right Ear:  Speech Reception Threshold (SRT) was obtained at 50 dBHL                 Speech discrimination score was 8% in quiet when words were presented at 100 dBHL      Left Ear:  Speech Reception Threshold (SRT) was obtained at 55 dBHL                 Speech discrimination score was 32% in quiet when words were presented at 100 dBHL    IMPRESSIONS:  Patient is expected to experience communication difficulty in all listening situations.   Patient is expected to benefit from amplification; benefit from amplification, however, will be limited by poor speech discrimination.  Cochlear implant evaluation may be considered to determine if patient would obtain additional benefit from cochlear " implant.       RECOMMENDATIONS  Continue with medical follow-up with Jazmin Moore CNP.  Hearing Aid Evaluation with an audiologist to discuss hearing technology (such as hearing aids) and services.  Consider cochlear implant evaluation.  Reassess hearing in 1 year (or sooner if medically indicated or if there is a concern for a change in hearing).    Continue with medical follow-up as indicated.       PATIENT EDUCATION  Discussed results and recommendations with patient.  Questions were addressed and the patient was encouraged to contact our department should concerns arise.       RICHY Owens, [facility], [place]-[facility]  Licensed Audiologist

## 2024-12-04 NOTE — Clinical Note
"Hello, your patient was seen for audiometric evaluation.  Audiogram may be found in \"Media\" and report may be found in \"Notes\" (when browsing \"Notes,\" \"Provider Only\" filter must be un-checked to see audiology note).    Please do not hesitate to contact me with any questions or concerns.  Thank you, Maddi Blackmon, CCC-A Senior Audiologist "

## 2024-12-04 NOTE — PROGRESS NOTES
-- DO NOT REPLY / DO NOT REPLY ALL --  -- Message is from [facility] [employer] ([facility]) --    General Patient Message: Patient would like to know if he needs to be seen 6 months from 5-2023 or within a year     Caller Information       Type Contact Phone/Fax    11/02/2023 09:46 AM CDT Phone (Incoming) Mark Anthony Plasencia (Self) [phone] (H)        Alternative phone number: [phone]    Can a detailed message be left? Yes    Message Turnaround:     Is it Working Hours? Yes - Working Hours     [place]:    Please give this turnaround time to the caller:   \"This message will be sent to [state Provider's name]. The clinical team will fulfill your request as soon as they review your message.\"                 Subjective   Patient ID: Tracey Viera is a 89 y.o. female who presents for Cerumen Impaction.    HPI  Patient here for ear cleaning. She is accompanied by her neighbor. No ear pain. No previous ear surgery.    Patient Active Problem List   Diagnosis    Advanced age    Forgetfulness    Wound of left leg    Wrist injury, right, initial encounter    After-cataract obscuring vision    AK (actinic keratosis)    Atherosclerosis of both carotid arteries    Balance problem    Bladder cancer (Multi)    Cigarette smoker one half pack a day or less    Circumscribed scleroderma    Closed fracture of left olecranon process    Diverticulosis of colon    Essential hypertension    Hyperlipidemia LDL goal <100    Impaired fasting glucose    Malignant neoplasm of skin    Laryngopharyngeal reflux (LPR)    Mild aortic insufficiency    Nonrheumatic aortic valve stenosis    Nontoxic multinodular goiter    Osteopenia    Pseudophakia    Subclinical hypothyroidism    Tubular adenoma of colon    Vitamin D deficiency    Weakness     Past Surgical History:   Procedure Laterality Date    OTHER SURGICAL HISTORY  12/09/2021    Tonsillectomy     Review of Systems    All other systems have been reviewed and are negative for complaints except for those mentioned in history of present illness, past medical history and problem list.    Objective   Physical Exam    Constitutional: No fever, chills, weight loss or weight gain  General appearance: Appears well, well-nourished, well groomed. No acute distress.    Communication: Normal communication    Psychiatric: Oriented to person, place and time. Normal mood and affect.    Neurologic: Cranial nerves II-XII grossly intact and symmetric bilaterally.    Head and Face:  Head: Atraumatic with no masses, lesions or scarring.  Face: Normal symmetry. No scars or deformities.  TMJ: Normal, no trismus.    Eyes: Conjunctiva not edematous or erythematous.     Right Ear: External inspection of ear with no  deformity, scars, or masses. EAC is impacted with cerumen, TM not visible.     Left Ear: External inspection of ear with no deformity, scars, or masses. EAC is impacted with cerumen, TM not visible.     Nose: External inspection of nose: No nasal lesions, lacerations or scars. Anterior rhinoscopy with limited visualization past the inferior turbinates. No tenderness on frontal or maxillary sinus palpation.    Oral Cavity/Mouth: Oral cavity and oropharynx mucosa moist and pink. No lesions or masses. Tonsils appear surgically removed. Uvula is midline. Tongue with no masses or lesions. Tongue with good mobility. The oropharynx is clear.    Neck: Normal appearing, symmetric, trachea midline.     Cardiovascular: Examination of peripheral vascular system shows no clubbing or cyanosis.    Respiratory: No respiratory distress increased work of breathing. Inspection of the chest with symmetric chest expansion and normal respiratory effort.    Skin: No head and neck rashes.    Lymph nodes: No adenopathy.    Procedure: Cerumen Removal  Indication: Cerumen Impaction  Risks, benefits, alternatives, and expectations discussed with patient and patient wishes to proceed.    Bilateral canals with cerumen impaction.  Using the microscope, suction, and alligator forceps, large amounts of soft brown cerumen removed bilaterally. Both TMs intact. No effusions or retractions noted.  Patient tolerated procedure well.     Diagnostic Results       Assessment/Plan   Diagnoses and all orders for this visit:  Sensorineural hearing loss (SNHL) of both ears  Bilateral impacted cerumen    Ears were successfully cleaned. Follow up in 6 months for repeat ear cleaning.    All questions answered to patient satisfaction.        CORINE Marie-CNP 12/04/24 2:31 PM

## 2024-12-06 ENCOUNTER — TELEPHONE (OUTPATIENT)
Dept: PRIMARY CARE | Facility: CLINIC | Age: 89
End: 2024-12-06
Payer: MEDICARE

## 2024-12-06 ENCOUNTER — APPOINTMENT (OUTPATIENT)
Dept: CARDIOLOGY | Facility: HOSPITAL | Age: 89
DRG: 871 | End: 2024-12-06
Payer: MEDICARE

## 2024-12-06 ENCOUNTER — APPOINTMENT (OUTPATIENT)
Dept: RADIOLOGY | Facility: HOSPITAL | Age: 89
DRG: 871 | End: 2024-12-06
Payer: MEDICARE

## 2024-12-06 ENCOUNTER — HOSPITAL ENCOUNTER (INPATIENT)
Facility: HOSPITAL | Age: 89
End: 2024-12-06
Attending: EMERGENCY MEDICINE
Payer: MEDICARE

## 2024-12-06 DIAGNOSIS — R10.84 GENERALIZED ABDOMINAL PAIN: ICD-10-CM

## 2024-12-06 DIAGNOSIS — R50.9 FEBRILE ILLNESS: Primary | ICD-10-CM
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
## 2024-12-06 DIAGNOSIS — N30.00 ACUTE CYSTITIS WITHOUT HEMATURIA: ICD-10-CM

## 2024-12-06 DIAGNOSIS — R11.2 NAUSEA AND VOMITING, UNSPECIFIED VOMITING TYPE: ICD-10-CM

## 2024-12-06 DIAGNOSIS — R00.0 TACHYCARDIA: ICD-10-CM

## 2024-12-06 LAB
ALBUMIN SERPL BCP-MCNC: 3.5 G/DL (ref 3.4–5)
ALP SERPL-CCNC: 99 U/L (ref 33–136)
ALT SERPL W P-5'-P-CCNC: 14 U/L (ref 7–45)
ANION GAP BLDV CALCULATED.4IONS-SCNC: 13 MMOL/L (ref 10–25)
ANION GAP SERPL CALC-SCNC: 14 MMOL/L (ref 10–20)
APPEARANCE UR: ABNORMAL
AST SERPL W P-5'-P-CCNC: 21 U/L (ref 9–39)
BACTERIA #/AREA URNS AUTO: ABNORMAL /HPF
BASE EXCESS BLDV CALC-SCNC: -0.2 MMOL/L (ref -2–3)
BASOPHILS # BLD AUTO: 0.03 X10*3/UL (ref 0–0.1)
BASOPHILS NFR BLD AUTO: 0.3 %
BILIRUB DIRECT SERPL-MCNC: 0.2 MG/DL (ref 0–0.3)
BILIRUB SERPL-MCNC: 0.9 MG/DL (ref 0–1.2)
BILIRUB UR STRIP.AUTO-MCNC: NEGATIVE MG/DL
BODY TEMPERATURE: 37 DEGREES CELSIUS
BUN SERPL-MCNC: 13 MG/DL (ref 6–23)
CA-I BLDV-SCNC: 1.19 MMOL/L (ref 1.1–1.33)
CALCIUM SERPL-MCNC: 8.7 MG/DL (ref 8.6–10.3)
CARDIAC TROPONIN I PNL SERPL HS: 50 NG/L (ref 0–13)
CARDIAC TROPONIN I PNL SERPL HS: 59 NG/L (ref 0–13)
CHLORIDE BLDV-SCNC: 103 MMOL/L (ref 98–107)
CHLORIDE SERPL-SCNC: 103 MMOL/L (ref 98–107)
CO2 SERPL-SCNC: 22 MMOL/L (ref 21–32)
COLOR UR: YELLOW
CREAT SERPL-MCNC: 0.7 MG/DL (ref 0.5–1.05)
EGFRCR SERPLBLD CKD-EPI 2021: 81 ML/MIN/1.73M*2
EOSINOPHIL # BLD AUTO: 0.01 X10*3/UL (ref 0–0.4)
EOSINOPHIL NFR BLD AUTO: 0.1 %
ERYTHROCYTE [DISTWIDTH] IN BLOOD BY AUTOMATED COUNT: 13.2 % (ref 11.5–14.5)
FLUAV RNA RESP QL NAA+PROBE: NOT DETECTED
FLUBV RNA RESP QL NAA+PROBE: NOT DETECTED
GLUCOSE BLDV-MCNC: 157 MG/DL (ref 74–99)
GLUCOSE SERPL-MCNC: 146 MG/DL (ref 74–99)
GLUCOSE UR STRIP.AUTO-MCNC: NORMAL MG/DL
HCO3 BLDV-SCNC: 21.8 MMOL/L (ref 22–26)
HCT VFR BLD AUTO: 39.2 % (ref 36–46)
HCT VFR BLD EST: 41 % (ref 36–46)
HGB BLD-MCNC: 13.5 G/DL (ref 12–16)
HGB BLDV-MCNC: 13.8 G/DL (ref 12–16)
IMM GRANULOCYTES # BLD AUTO: 0.04 X10*3/UL (ref 0–0.5)
IMM GRANULOCYTES NFR BLD AUTO: 0.3 % (ref 0–0.9)
INHALED O2 CONCENTRATION: 21 %
KETONES UR STRIP.AUTO-MCNC: NEGATIVE MG/DL
LACTATE BLDV-SCNC: 2 MMOL/L (ref 0.4–2)
LACTATE SERPL-SCNC: 1.6 MMOL/L (ref 0.4–2)
LEUKOCYTE ESTERASE UR QL STRIP.AUTO: ABNORMAL
LIPASE SERPL-CCNC: 38 U/L (ref 9–82)
LYMPHOCYTES # BLD AUTO: 0.21 X10*3/UL (ref 0.8–3)
LYMPHOCYTES NFR BLD AUTO: 1.8 %
MCH RBC QN AUTO: 32.4 PG (ref 26–34)
MCHC RBC AUTO-ENTMCNC: 34.4 G/DL (ref 32–36)
MCV RBC AUTO: 94 FL (ref 80–100)
MONOCYTES # BLD AUTO: 0.58 X10*3/UL (ref 0.05–0.8)
MONOCYTES NFR BLD AUTO: 5 %
MUCOUS THREADS #/AREA URNS AUTO: ABNORMAL /LPF
NEUTROPHILS # BLD AUTO: 10.65 X10*3/UL (ref 1.6–5.5)
NEUTROPHILS NFR BLD AUTO: 92.5 %
NITRITE UR QL STRIP.AUTO: NEGATIVE
NRBC BLD-RTO: 0 /100 WBCS (ref 0–0)
OXYHGB MFR BLDV: 91 % (ref 45–75)
PCO2 BLDV: 28 MM HG (ref 41–51)
PH BLDV: 7.5 PH (ref 7.33–7.43)
PH UR STRIP.AUTO: 6 [PH]
PLATELET # BLD AUTO: 209 X10*3/UL (ref 150–450)
PO2 BLDV: 60 MM HG (ref 35–45)
POTASSIUM BLDV-SCNC: 3.3 MMOL/L (ref 3.5–5.3)
POTASSIUM SERPL-SCNC: 3.1 MMOL/L (ref 3.5–5.3)
PROT SERPL-MCNC: 6.1 G/DL (ref 6.4–8.2)
PROT UR STRIP.AUTO-MCNC: ABNORMAL MG/DL
RBC # BLD AUTO: 4.17 X10*6/UL (ref 4–5.2)
RBC # UR STRIP.AUTO: ABNORMAL /UL
RBC #/AREA URNS AUTO: ABNORMAL /HPF
RSV RNA RESP QL NAA+PROBE: NOT DETECTED
SAO2 % BLDV: 94 % (ref 45–75)
SARS-COV-2 RNA RESP QL NAA+PROBE: NOT DETECTED
SODIUM BLDV-SCNC: 134 MMOL/L (ref 136–145)
SODIUM SERPL-SCNC: 136 MMOL/L (ref 136–145)
SP GR UR STRIP.AUTO: 1.01
UROBILINOGEN UR STRIP.AUTO-MCNC: NORMAL MG/DL
WBC # BLD AUTO: 11.5 X10*3/UL (ref 4.4–11.3)
WBC #/AREA URNS AUTO: >50 /HPF
WBC CLUMPS #/AREA URNS AUTO: ABNORMAL /HPF

## 2024-12-06 PROCEDURE — 87637 SARSCOV2&INF A&B&RSV AMP PRB: CPT | Performed by: EMERGENCY MEDICINE

## 2024-12-06 PROCEDURE — 74177 CT ABD & PELVIS W/CONTRAST: CPT

## 2024-12-06 PROCEDURE — 73502 X-RAY EXAM HIP UNI 2-3 VIEWS: CPT | Mod: LT

## 2024-12-06 PROCEDURE — 96365 THER/PROPH/DIAG IV INF INIT: CPT

## 2024-12-06 PROCEDURE — 74177 CT ABD & PELVIS W/CONTRAST: CPT | Mod: FOREIGN READ | Performed by: RADIOLOGY

## 2024-12-06 PROCEDURE — 99285 EMERGENCY DEPT VISIT HI MDM: CPT | Mod: 25 | Performed by: EMERGENCY MEDICINE

## 2024-12-06 PROCEDURE — 83690 ASSAY OF LIPASE: CPT | Performed by: EMERGENCY MEDICINE

## 2024-12-06 PROCEDURE — 84484 ASSAY OF TROPONIN QUANT: CPT

## 2024-12-06 PROCEDURE — 82248 BILIRUBIN DIRECT: CPT | Performed by: EMERGENCY MEDICINE

## 2024-12-06 PROCEDURE — 71045 X-RAY EXAM CHEST 1 VIEW: CPT | Mod: FOREIGN READ | Performed by: RADIOLOGY

## 2024-12-06 PROCEDURE — 2500000001 HC RX 250 WO HCPCS SELF ADMINISTERED DRUGS (ALT 637 FOR MEDICARE OP): Performed by: EMERGENCY MEDICINE

## 2024-12-06 PROCEDURE — 2500000001 HC RX 250 WO HCPCS SELF ADMINISTERED DRUGS (ALT 637 FOR MEDICARE OP)

## 2024-12-06 PROCEDURE — 99223 1ST HOSP IP/OBS HIGH 75: CPT

## 2024-12-06 PROCEDURE — 87086 URINE CULTURE/COLONY COUNT: CPT | Mod: PARLAB | Performed by: EMERGENCY MEDICINE

## 2024-12-06 PROCEDURE — 84132 ASSAY OF SERUM POTASSIUM: CPT | Performed by: EMERGENCY MEDICINE

## 2024-12-06 PROCEDURE — 36415 COLL VENOUS BLD VENIPUNCTURE: CPT | Performed by: EMERGENCY MEDICINE

## 2024-12-06 PROCEDURE — 2500000004 HC RX 250 GENERAL PHARMACY W/ HCPCS (ALT 636 FOR OP/ED)

## 2024-12-06 PROCEDURE — 1200000002 HC GENERAL ROOM WITH TELEMETRY DAILY

## 2024-12-06 PROCEDURE — 2550000001 HC RX 255 CONTRASTS: Performed by: EMERGENCY MEDICINE

## 2024-12-06 PROCEDURE — 87040 BLOOD CULTURE FOR BACTERIA: CPT | Mod: PARLAB | Performed by: EMERGENCY MEDICINE

## 2024-12-06 PROCEDURE — 93005 ELECTROCARDIOGRAM TRACING: CPT

## 2024-12-06 PROCEDURE — 83605 ASSAY OF LACTIC ACID: CPT | Performed by: EMERGENCY MEDICINE

## 2024-12-06 PROCEDURE — 80076 HEPATIC FUNCTION PANEL: CPT | Performed by: EMERGENCY MEDICINE

## 2024-12-06 PROCEDURE — 73502 X-RAY EXAM HIP UNI 2-3 VIEWS: CPT | Mod: LEFT SIDE | Performed by: RADIOLOGY

## 2024-12-06 PROCEDURE — 96361 HYDRATE IV INFUSION ADD-ON: CPT

## 2024-12-06 PROCEDURE — 81001 URINALYSIS AUTO W/SCOPE: CPT | Performed by: EMERGENCY MEDICINE

## 2024-12-06 PROCEDURE — 85025 COMPLETE CBC W/AUTO DIFF WBC: CPT | Performed by: EMERGENCY MEDICINE

## 2024-12-06 PROCEDURE — 84484 ASSAY OF TROPONIN QUANT: CPT | Performed by: EMERGENCY MEDICINE

## 2024-12-06 PROCEDURE — 2500000004 HC RX 250 GENERAL PHARMACY W/ HCPCS (ALT 636 FOR OP/ED): Performed by: EMERGENCY MEDICINE

## 2024-12-06 PROCEDURE — 71045 X-RAY EXAM CHEST 1 VIEW: CPT

## 2024-12-06 RX ORDER — POTASSIUM CHLORIDE 1.5 G/1.58G
40 POWDER, FOR SOLUTION ORAL ONCE
Status: COMPLETED | OUTPATIENT
Start: 2024-12-06 | End: 2024-12-06

## 2024-12-06 RX ORDER — ACETAMINOPHEN 325 MG/1
975 TABLET ORAL ONCE
Status: COMPLETED | OUTPATIENT
Start: 2024-12-06 | End: 2024-12-06

## 2024-12-06 RX ORDER — VANCOMYCIN HYDROCHLORIDE 1 G/200ML
1000 INJECTION, SOLUTION INTRAVENOUS ONCE
Status: COMPLETED | OUTPATIENT
Start: 2024-12-06 | End: 2024-12-06

## 2024-12-06 RX ORDER — VANCOMYCIN HYDROCHLORIDE 1 G/20ML
INJECTION, POWDER, LYOPHILIZED, FOR SOLUTION INTRAVENOUS DAILY PRN
Status: DISPENSED | OUTPATIENT
Start: 2024-12-06

## 2024-12-06 RX ORDER — VANCOMYCIN HYDROCHLORIDE 1 G/200ML
1000 INJECTION, SOLUTION INTRAVENOUS EVERY 24 HOURS
Status: DISCONTINUED | OUTPATIENT
Start: 2024-12-07 | End: 2024-12-08 | Stop reason: DRUGHIGH

## 2024-12-06 RX ORDER — POLYETHYLENE GLYCOL 3350 17 G/17G
17 POWDER, FOR SOLUTION ORAL DAILY
Status: DISPENSED | OUTPATIENT
Start: 2024-12-07

## 2024-12-06 RX ORDER — ACETAMINOPHEN 325 MG/1
650 TABLET ORAL EVERY 4 HOURS PRN
Status: ACTIVE | OUTPATIENT
Start: 2024-12-06

## 2024-12-06 RX ORDER — HEPARIN SODIUM 5000 [USP'U]/ML
5000 INJECTION, SOLUTION INTRAVENOUS; SUBCUTANEOUS EVERY 8 HOURS
Status: DISPENSED | OUTPATIENT
Start: 2024-12-06

## 2024-12-06 RX ORDER — SODIUM CHLORIDE 9 MG/ML
75 INJECTION, SOLUTION INTRAVENOUS CONTINUOUS
Status: ACTIVE | OUTPATIENT
Start: 2024-12-06 | End: 2024-12-07

## 2024-12-06 RX ADMIN — VANCOMYCIN HYDROCHLORIDE 1000 MG: 1 INJECTION, SOLUTION INTRAVENOUS at 21:07

## 2024-12-06 RX ADMIN — IOHEXOL 75 ML: 350 INJECTION, SOLUTION INTRAVENOUS at 21:00

## 2024-12-06 RX ADMIN — SODIUM CHLORIDE 1000 ML: 9 INJECTION, SOLUTION INTRAVENOUS at 20:37

## 2024-12-06 RX ADMIN — SODIUM CHLORIDE 75 ML/HR: 9 INJECTION, SOLUTION INTRAVENOUS at 23:32

## 2024-12-06 RX ADMIN — HEPARIN SODIUM 5000 UNITS: 5000 INJECTION INTRAVENOUS; SUBCUTANEOUS at 23:32

## 2024-12-06 RX ADMIN — PIPERACILLIN SODIUM AND TAZOBACTAM SODIUM 4.5 G: 4; .5 INJECTION, SOLUTION INTRAVENOUS at 21:03

## 2024-12-06 RX ADMIN — POTASSIUM CHLORIDE 40 MEQ: 1.5 POWDER, FOR SOLUTION ORAL at 23:32

## 2024-12-06 RX ADMIN — ACETAMINOPHEN 975 MG: 325 TABLET, FILM COATED ORAL at 20:38

## 2024-12-06 ASSESSMENT — PAIN - FUNCTIONAL ASSESSMENT: PAIN_FUNCTIONAL_ASSESSMENT: 0-10

## 2024-12-06 ASSESSMENT — PAIN SCALES - GENERAL: PAINLEVEL_OUTOF10: 0 - NO PAIN

## 2024-12-07 LAB
ANION GAP SERPL CALC-SCNC: 10 MMOL/L (ref 10–20)
BUN SERPL-MCNC: 13 MG/DL (ref 6–23)
CALCIUM SERPL-MCNC: 7.9 MG/DL (ref 8.6–10.3)
CARDIAC TROPONIN I PNL SERPL HS: 39 NG/L (ref 0–13)
CARDIAC TROPONIN I PNL SERPL HS: 51 NG/L (ref 0–13)
CHLORIDE SERPL-SCNC: 110 MMOL/L (ref 98–107)
CO2 SERPL-SCNC: 23 MMOL/L (ref 21–32)
CREAT SERPL-MCNC: 0.65 MG/DL (ref 0.5–1.05)
EGFRCR SERPLBLD CKD-EPI 2021: 82 ML/MIN/1.73M*2
ERYTHROCYTE [DISTWIDTH] IN BLOOD BY AUTOMATED COUNT: 13.5 % (ref 11.5–14.5)
GLUCOSE SERPL-MCNC: 130 MG/DL (ref 74–99)
HCT VFR BLD AUTO: 34.5 % (ref 36–46)
HGB BLD-MCNC: 11.6 G/DL (ref 12–16)
HOLD SPECIMEN: NORMAL
MCH RBC QN AUTO: 32.2 PG (ref 26–34)
MCHC RBC AUTO-ENTMCNC: 33.6 G/DL (ref 32–36)
MCV RBC AUTO: 96 FL (ref 80–100)
NRBC BLD-RTO: 0 /100 WBCS (ref 0–0)
PLATELET # BLD AUTO: 193 X10*3/UL (ref 150–450)
POTASSIUM SERPL-SCNC: 4.1 MMOL/L (ref 3.5–5.3)
RBC # BLD AUTO: 3.6 X10*6/UL (ref 4–5.2)
SODIUM SERPL-SCNC: 139 MMOL/L (ref 136–145)
TSH SERPL-ACNC: 3.86 MIU/L (ref 0.44–3.98)
WBC # BLD AUTO: 10.8 X10*3/UL (ref 4.4–11.3)

## 2024-12-07 PROCEDURE — 2500000004 HC RX 250 GENERAL PHARMACY W/ HCPCS (ALT 636 FOR OP/ED)

## 2024-12-07 PROCEDURE — 84484 ASSAY OF TROPONIN QUANT: CPT

## 2024-12-07 PROCEDURE — 99222 1ST HOSP IP/OBS MODERATE 55: CPT | Performed by: INTERNAL MEDICINE

## 2024-12-07 PROCEDURE — 36415 COLL VENOUS BLD VENIPUNCTURE: CPT

## 2024-12-07 PROCEDURE — 1200000002 HC GENERAL ROOM WITH TELEMETRY DAILY

## 2024-12-07 PROCEDURE — 2500000001 HC RX 250 WO HCPCS SELF ADMINISTERED DRUGS (ALT 637 FOR MEDICARE OP): Performed by: INTERNAL MEDICINE

## 2024-12-07 PROCEDURE — 80048 BASIC METABOLIC PNL TOTAL CA: CPT

## 2024-12-07 PROCEDURE — 84443 ASSAY THYROID STIM HORMONE: CPT | Performed by: INTERNAL MEDICINE

## 2024-12-07 PROCEDURE — 2500000004 HC RX 250 GENERAL PHARMACY W/ HCPCS (ALT 636 FOR OP/ED): Performed by: INTERNAL MEDICINE

## 2024-12-07 PROCEDURE — 85027 COMPLETE CBC AUTOMATED: CPT

## 2024-12-07 RX ORDER — SODIUM CHLORIDE 9 MG/ML
75 INJECTION, SOLUTION INTRAVENOUS CONTINUOUS
Status: ACTIVE | OUTPATIENT
Start: 2024-12-07 | End: 2024-12-08

## 2024-12-07 RX ORDER — NYSTATIN 100000 [USP'U]/G
1 POWDER TOPICAL 2 TIMES DAILY
Status: DISPENSED | OUTPATIENT
Start: 2024-12-07

## 2024-12-07 RX ORDER — LEVOTHYROXINE SODIUM 100 UG/1
100 TABLET ORAL
Status: DISPENSED | OUTPATIENT
Start: 2024-12-08

## 2024-12-07 RX ADMIN — HEPARIN SODIUM 5000 UNITS: 5000 INJECTION INTRAVENOUS; SUBCUTANEOUS at 06:40

## 2024-12-07 RX ADMIN — SODIUM CHLORIDE 250 ML: 9 INJECTION, SOLUTION INTRAVENOUS at 09:13

## 2024-12-07 RX ADMIN — PIPERACILLIN SODIUM AND TAZOBACTAM SODIUM 3.38 G: 3; .375 INJECTION, SOLUTION INTRAVENOUS at 21:25

## 2024-12-07 RX ADMIN — SODIUM CHLORIDE 75 ML/HR: 9 INJECTION, SOLUTION INTRAVENOUS at 15:04

## 2024-12-07 RX ADMIN — VANCOMYCIN HYDROCHLORIDE 1000 MG: 1 INJECTION, SOLUTION INTRAVENOUS at 21:25

## 2024-12-07 RX ADMIN — HEPARIN SODIUM 5000 UNITS: 5000 INJECTION INTRAVENOUS; SUBCUTANEOUS at 11:59

## 2024-12-07 RX ADMIN — POLYETHYLENE GLYCOL 3350 17 G: 17 POWDER, FOR SOLUTION ORAL at 09:15

## 2024-12-07 RX ADMIN — PIPERACILLIN SODIUM AND TAZOBACTAM SODIUM 3.38 G: 3; .375 INJECTION, SOLUTION INTRAVENOUS at 11:02

## 2024-12-07 RX ADMIN — SODIUM CHLORIDE 75 ML/HR: 9 INJECTION, SOLUTION INTRAVENOUS at 11:03

## 2024-12-07 RX ADMIN — HEPARIN SODIUM 5000 UNITS: 5000 INJECTION INTRAVENOUS; SUBCUTANEOUS at 21:25

## 2024-12-07 RX ADMIN — NYSTATIN 1 APPLICATION: 100000 POWDER TOPICAL at 21:26

## 2024-12-07 RX ADMIN — PIPERACILLIN SODIUM AND TAZOBACTAM SODIUM 3.38 G: 3; .375 INJECTION, SOLUTION INTRAVENOUS at 17:29

## 2024-12-07 RX ADMIN — PIPERACILLIN SODIUM AND TAZOBACTAM SODIUM 3.38 G: 3; .375 INJECTION, SOLUTION INTRAVENOUS at 03:28

## 2024-12-07 SDOH — HEALTH STABILITY: MENTAL HEALTH: HOW OFTEN DO YOU HAVE A DRINK CONTAINING ALCOHOL?: PATIENT UNABLE TO ANSWER

## 2024-12-07 SDOH — ECONOMIC STABILITY: TRANSPORTATION INSECURITY
IN THE PAST 12 MONTHS, HAS LACK OF TRANSPORTATION KEPT YOU FROM MEDICAL APPOINTMENTS OR FROM GETTING MEDICATIONS?: PATIENT UNABLE TO ANSWER

## 2024-12-07 SDOH — HEALTH STABILITY: PHYSICAL HEALTH: ON AVERAGE, HOW MANY MINUTES DO YOU ENGAGE IN EXERCISE AT THIS LEVEL?: PATIENT UNABLE TO ANSWER

## 2024-12-07 SDOH — HEALTH STABILITY: PHYSICAL HEALTH
HOW OFTEN DO YOU NEED TO HAVE SOMEONE HELP YOU WHEN YOU READ INSTRUCTIONS, PAMPHLETS, OR OTHER WRITTEN MATERIAL FROM YOUR DOCTOR OR PHARMACY?: PATIENT UNABLE TO RESPOND

## 2024-12-07 SDOH — ECONOMIC STABILITY: FOOD INSECURITY
WITHIN THE PAST 12 MONTHS, THE FOOD YOU BOUGHT JUST DIDN'T LAST AND YOU DIDN'T HAVE MONEY TO GET MORE.: PATIENT UNABLE TO ANSWER

## 2024-12-07 SDOH — ECONOMIC STABILITY: INCOME INSECURITY
IN THE PAST 12 MONTHS HAS THE ELECTRIC, GAS, OIL, OR WATER COMPANY THREATENED TO SHUT OFF SERVICES IN YOUR HOME?: PATIENT UNABLE TO ANSWER

## 2024-12-07 SDOH — SOCIAL STABILITY: SOCIAL NETWORK: HOW OFTEN DO YOU GET TOGETHER WITH FRIENDS OR RELATIVES?: PATIENT UNABLE TO ANSWER

## 2024-12-07 SDOH — SOCIAL STABILITY: SOCIAL INSECURITY
WITHIN THE LAST YEAR, HAVE YOU BEEN RAPED OR FORCED TO HAVE ANY KIND OF SEXUAL ACTIVITY BY YOUR PARTNER OR EX-PARTNER?: PATIENT UNABLE TO ANSWER

## 2024-12-07 SDOH — ECONOMIC STABILITY: HOUSING INSECURITY
IN THE LAST 12 MONTHS, WAS THERE A TIME WHEN YOU WERE NOT ABLE TO PAY THE MORTGAGE OR RENT ON TIME?: PATIENT UNABLE TO ANSWER

## 2024-12-07 SDOH — ECONOMIC STABILITY: FOOD INSECURITY
WITHIN THE PAST 12 MONTHS, YOU WORRIED THAT YOUR FOOD WOULD RUN OUT BEFORE YOU GOT THE MONEY TO BUY MORE.: PATIENT UNABLE TO ANSWER

## 2024-12-07 SDOH — SOCIAL STABILITY: SOCIAL NETWORK: HOW OFTEN DO YOU ATTEND MEETINGS OF THE CLUBS OR ORGANIZATIONS YOU BELONG TO?: PATIENT UNABLE TO ANSWER

## 2024-12-07 SDOH — SOCIAL STABILITY: SOCIAL INSECURITY
WITHIN THE LAST YEAR, HAVE YOU BEEN HUMILIATED OR EMOTIONALLY ABUSED IN OTHER WAYS BY YOUR PARTNER OR EX-PARTNER?: PATIENT UNABLE TO ANSWER

## 2024-12-07 SDOH — SOCIAL STABILITY: SOCIAL INSECURITY: WITHIN THE LAST YEAR, HAVE YOU BEEN AFRAID OF YOUR PARTNER OR EX-PARTNER?: PATIENT UNABLE TO ANSWER

## 2024-12-07 SDOH — HEALTH STABILITY: MENTAL HEALTH: HOW MANY DRINKS CONTAINING ALCOHOL DO YOU HAVE ON A TYPICAL DAY WHEN YOU ARE DRINKING?: PATIENT UNABLE TO ANSWER

## 2024-12-07 SDOH — SOCIAL STABILITY: SOCIAL INSECURITY
WITHIN THE LAST YEAR, HAVE YOU BEEN KICKED, HIT, SLAPPED, OR OTHERWISE PHYSICALLY HURT BY YOUR PARTNER OR EX-PARTNER?: PATIENT UNABLE TO ANSWER

## 2024-12-07 SDOH — SOCIAL STABILITY: SOCIAL NETWORK
DO YOU BELONG TO ANY CLUBS OR ORGANIZATIONS SUCH AS CHURCH GROUPS, UNIONS, FRATERNAL OR ATHLETIC GROUPS, OR SCHOOL GROUPS?: PATIENT UNABLE TO ANSWER

## 2024-12-07 SDOH — HEALTH STABILITY: MENTAL HEALTH: HOW OFTEN DO YOU HAVE SIX OR MORE DRINKS ON ONE OCCASION?: PATIENT UNABLE TO ANSWER

## 2024-12-07 SDOH — HEALTH STABILITY: PHYSICAL HEALTH
ON AVERAGE, HOW MANY DAYS PER WEEK DO YOU ENGAGE IN MODERATE TO STRENUOUS EXERCISE (LIKE A BRISK WALK)?: PATIENT UNABLE TO ANSWER

## 2024-12-07 SDOH — ECONOMIC STABILITY: HOUSING INSECURITY: AT ANY TIME IN THE PAST 12 MONTHS, WERE YOU HOMELESS OR LIVING IN A SHELTER (INCLUDING NOW)?: PATIENT UNABLE TO ANSWER

## 2024-12-07 SDOH — ECONOMIC STABILITY: FOOD INSECURITY
HOW HARD IS IT FOR YOU TO PAY FOR THE VERY BASICS LIKE FOOD, HOUSING, MEDICAL CARE, AND HEATING?: PATIENT UNABLE TO ANSWER

## 2024-12-07 SDOH — SOCIAL STABILITY: SOCIAL NETWORK: HOW OFTEN DO YOU ATTEND CHURCH OR RELIGIOUS SERVICES?: PATIENT UNABLE TO ANSWER

## 2024-12-07 SDOH — SOCIAL STABILITY: SOCIAL NETWORK: IN A TYPICAL WEEK, HOW MANY TIMES DO YOU TALK ON THE PHONE WITH FAMILY, FRIENDS, OR NEIGHBORS?: PATIENT UNABLE TO ANSWER

## 2024-12-07 SDOH — ECONOMIC STABILITY: HOUSING INSECURITY: IN THE PAST 12 MONTHS, HOW MANY TIMES HAVE YOU MOVED WHERE YOU WERE LIVING?: 0

## 2024-12-07 SDOH — SOCIAL STABILITY: SOCIAL INSECURITY: ARE YOU MARRIED, WIDOWED, DIVORCED, SEPARATED, NEVER MARRIED, OR LIVING WITH A PARTNER?: PATIENT UNABLE TO ANSWER

## 2024-12-07 SDOH — SOCIAL STABILITY: SOCIAL INSECURITY: WERE YOU ABLE TO COMPLETE ALL THE BEHAVIORAL HEALTH SCREENINGS?: NO

## 2024-12-07 SDOH — HEALTH STABILITY: MENTAL HEALTH
DO YOU FEEL STRESS - TENSE, RESTLESS, NERVOUS, OR ANXIOUS, OR UNABLE TO SLEEP AT NIGHT BECAUSE YOUR MIND IS TROUBLED ALL THE TIME - THESE DAYS?: PATIENT UNABLE TO ANSWER

## 2024-12-07 ASSESSMENT — COGNITIVE AND FUNCTIONAL STATUS - GENERAL
MOVING TO AND FROM BED TO CHAIR: A LITTLE
DAILY ACTIVITIY SCORE: 18
CLIMB 3 TO 5 STEPS WITH RAILING: A LOT
PERSONAL GROOMING: A LITTLE
MOBILITY SCORE: 17
PATIENT BASELINE BEDBOUND: NO
EATING MEALS: A LITTLE
STANDING UP FROM CHAIR USING ARMS: A LITTLE
MOVING FROM LYING ON BACK TO SITTING ON SIDE OF FLAT BED WITH BEDRAILS: A LITTLE
DRESSING REGULAR LOWER BODY CLOTHING: A LITTLE
DRESSING REGULAR UPPER BODY CLOTHING: A LITTLE
WALKING IN HOSPITAL ROOM: A LITTLE
CLIMB 3 TO 5 STEPS WITH RAILING: A LOT
HELP NEEDED FOR BATHING: A LITTLE
STANDING UP FROM CHAIR USING ARMS: A LITTLE
TOILETING: A LITTLE
MOBILITY SCORE: 18
WALKING IN HOSPITAL ROOM: A LITTLE
TURNING FROM BACK TO SIDE WHILE IN FLAT BAD: A LITTLE
TURNING FROM BACK TO SIDE WHILE IN FLAT BAD: A LITTLE
MOVING TO AND FROM BED TO CHAIR: A LITTLE

## 2024-12-07 ASSESSMENT — ACTIVITIES OF DAILY LIVING (ADL)
PATIENT'S MEMORY ADEQUATE TO SAFELY COMPLETE DAILY ACTIVITIES?: NO
HEARING - LEFT EAR: DIFFICULTY WITH NOISE
WALKS IN HOME: NEEDS ASSISTANCE
BATHING: NEEDS ASSISTANCE
HEARING - RIGHT EAR: DIFFICULTY WITH NOISE
TOILETING: NEEDS ASSISTANCE
ASSISTIVE_DEVICE: CANE;WALKER
LACK_OF_TRANSPORTATION: PATIENT UNABLE TO ANSWER
JUDGMENT_ADEQUATE_SAFELY_COMPLETE_DAILY_ACTIVITIES: NO
LACK_OF_TRANSPORTATION: PATIENT UNABLE TO ANSWER
GROOMING: NEEDS ASSISTANCE
DRESSING YOURSELF: NEEDS ASSISTANCE
LACK_OF_TRANSPORTATION: PATIENT UNABLE TO ANSWER
ADEQUATE_TO_COMPLETE_ADL: NO
FEEDING YOURSELF: NEEDS ASSISTANCE

## 2024-12-07 ASSESSMENT — LIFESTYLE VARIABLES
SKIP TO QUESTIONS 9-10: 0
SKIP TO QUESTIONS 9-10: 0
AUDIT-C TOTAL SCORE: -1
AUDIT-C TOTAL SCORE: -1

## 2024-12-07 ASSESSMENT — PAIN SCALES - GENERAL
PAINLEVEL_OUTOF10: 0 - NO PAIN
PAINLEVEL_OUTOF10: 0 - NO PAIN

## 2024-12-07 ASSESSMENT — PAIN - FUNCTIONAL ASSESSMENT: PAIN_FUNCTIONAL_ASSESSMENT: 0-10

## 2024-12-07 NOTE — H&P
History Of Present Illness  Tracey Viera is a 89 y.o. female with a past medical history of HTN, HLD, dementia, hypothyroidism, myelodysplastic syndrome, hx of bladder and skin cancer, who presented to [facility] ED today from home with abdominal pain, fever, and vomiting. Caregiver, Serena, at bedside stated patient had a fever and emesis x 1 today. Patient is a poor historian and unable to provide history, but at baseline mentation per caregiver. No reported trauma, falls, cough, congestion, incontinence, seizures, syncope or near syncope. No complaints of chest pain, shortness of breath, urinary symptoms, diarrhea, or constipation. No history of reported recent travel, hospitalization, or antibiotic use. POA stated patient is a full code.    ER Course: VS on ED arrival: 37.5C, /69, , RR 24, 92% on RA. EKG unavailable for my review. EKG: sinus tachycardia normal axis rate about 115 with nonspecific ST and T wave change, per ED physician. Labs: glucose 146. Potassium 3.1. Troponin 50. WBC 11.5, neutrophils 10.65. Covid-19/influenza/RSV negative. Blood gas: pH 7.50, pCO2 28, pO2 60, sO2 94. UA: turbid, 1+protein, trace blood, +leukocytes, 3+bacteria, WBC>50. Urine and blood cultures collected. See imaging results below.  Vancomycin, zosyn, Tylenol, and 1L NS given in ED. Pt will be admitted under the care of Dr. Francisco Calvert who will continue to follow. I was asked to H&P and place initial admission orders.      Past Medical History  As above    Surgical History  Past Surgical History:   Procedure Laterality Date    OTHER SURGICAL HISTORY  12/09/2021    Tonsillectomy        Social History  She reports that she has been smoking cigarettes. She has never used smokeless tobacco. She reports current alcohol use. She reports that she does not use drugs. Lives alone, has caretaker. Ambulates with walker/cane.    Family History  No family history on file.     Allergies  Patient has no known  "allergies.    Review of Systems  10 point review of systems negative except as stated in HPI.   Physical Exam  Constitutional:       Comments: Frail, elderly female who is hard of hearing (she needs to look at you and read your lips while talking)   HENT:      Head: Normocephalic and atraumatic.      Nose: Nose normal.      Mouth/Throat:      Mouth: Mucous membranes are moist.      Pharynx: Oropharynx is clear.   Eyes:      Extraocular Movements: Extraocular movements intact.      Conjunctiva/sclera: Conjunctivae normal.      Pupils: Pupils are equal, round, and reactive to light.   Cardiovascular:      Rate and Rhythm: Tachycardia present. Rhythm irregular.      Pulses: Normal pulses.      Heart sounds: Murmur heard.   Pulmonary:      Effort: Pulmonary effort is normal.      Breath sounds: Normal breath sounds.   Abdominal:      General: Abdomen is flat. Bowel sounds are normal.      Palpations: Abdomen is soft.      Tenderness: There is abdominal tenderness.      Comments: Tenderness to RLQ with palpation   Musculoskeletal:         General: Normal range of motion.      Cervical back: Normal range of motion and neck supple.      Comments: Non-tender hard mass to left hip on palpation.   Skin:     General: Skin is warm and dry.      Capillary Refill: Capillary refill takes less than 2 seconds.   Neurological:      General: No focal deficit present.      Mental Status: She is alert. Mental status is at baseline.   Psychiatric:         Mood and Affect: Mood normal.         Behavior: Behavior normal.          Last Recorded Vitals  Blood pressure 113/57, pulse 95, temperature 37.5 °C (99.5 °F), temperature source Temporal, resp. rate (!) 25, height 1.64 m (5' 4.57\"), weight 58 kg (127 lb 13.9 oz), SpO2 94%.    Relevant Results  Results for orders placed or performed during the hospital encounter of 12/06/24 (from the past 24 hours)   CBC and Auto Differential   Result Value Ref Range    WBC 11.5 (H) 4.4 - 11.3 x10*3/uL "    nRBC 0.0 0.0 - 0.0 /100 WBCs    RBC 4.17 4.00 - 5.20 x10*6/uL    Hemoglobin 13.5 12.0 - 16.0 g/dL    Hematocrit 39.2 36.0 - 46.0 %    MCV 94 80 - 100 fL    MCH 32.4 26.0 - 34.0 pg    MCHC 34.4 32.0 - 36.0 g/dL    RDW 13.2 11.5 - 14.5 %    Platelets 209 150 - 450 x10*3/uL    Neutrophils % 92.5 40.0 - 80.0 %    Immature Granulocytes %, Automated 0.3 0.0 - 0.9 %    Lymphocytes % 1.8 13.0 - 44.0 %    Monocytes % 5.0 2.0 - 10.0 %    Eosinophils % 0.1 0.0 - 6.0 %    Basophils % 0.3 0.0 - 2.0 %    Neutrophils Absolute 10.65 (H) 1.60 - 5.50 x10*3/uL    Immature Granulocytes Absolute, Automated 0.04 0.00 - 0.50 x10*3/uL    Lymphocytes Absolute 0.21 (L) 0.80 - 3.00 x10*3/uL    Monocytes Absolute 0.58 0.05 - 0.80 x10*3/uL    Eosinophils Absolute 0.01 0.00 - 0.40 x10*3/uL    Basophils Absolute 0.03 0.00 - 0.10 x10*3/uL   Basic metabolic panel   Result Value Ref Range    Glucose 146 (H) 74 - 99 mg/dL    Sodium 136 136 - 145 mmol/L    Potassium 3.1 (L) 3.5 - 5.3 mmol/L    Chloride 103 98 - 107 mmol/L    Bicarbonate 22 21 - 32 mmol/L    Anion Gap 14 10 - 20 mmol/L    Urea Nitrogen 13 6 - 23 mg/dL    Creatinine 0.70 0.50 - 1.05 mg/dL    eGFR 81 >60 mL/min/1.73m*2    Calcium 8.7 8.6 - 10.3 mg/dL   Lipase   Result Value Ref Range    Lipase 38 9 - 82 U/L   Hepatic function panel   Result Value Ref Range    Albumin 3.5 3.4 - 5.0 g/dL    Bilirubin, Total 0.9 0.0 - 1.2 mg/dL    Bilirubin, Direct 0.2 0.0 - 0.3 mg/dL    Alkaline Phosphatase 99 33 - 136 U/L    ALT 14 7 - 45 U/L    AST 21 9 - 39 U/L    Total Protein 6.1 (L) 6.4 - 8.2 g/dL   Lactate   Result Value Ref Range    Lactate 1.6 0.4 - 2.0 mmol/L   Blood Gas Venous Full Panel   Result Value Ref Range    POCT pH, Venous 7.50 (H) 7.33 - 7.43 pH    POCT pCO2, Venous 28 (L) 41 - 51 mm Hg    POCT pO2, Venous 60 (H) 35 - 45 mm Hg    POCT SO2, Venous 94 (H) 45 - 75 %    POCT Oxy Hemoglobin, Venous 91.0 (H) 45.0 - 75.0 %    POCT Hematocrit Calculated, Venous 41.0 36.0 - 46.0 %    POCT  Sodium, Venous 134 (L) 136 - 145 mmol/L    POCT Potassium, Venous 3.3 (L) 3.5 - 5.3 mmol/L    POCT Chloride, Venous 103 98 - 107 mmol/L    POCT Ionized Calicum, Venous 1.19 1.10 - 1.33 mmol/L    POCT Glucose, Venous 157 (H) 74 - 99 mg/dL    POCT Lactate, Venous 2.0 0.4 - 2.0 mmol/L    POCT Base Excess, Venous -0.2 -2.0 - 3.0 mmol/L    POCT HCO3 Calculated, Venous 21.8 (L) 22.0 - 26.0 mmol/L    POCT Hemoglobin, Venous 13.8 12.0 - 16.0 g/dL    POCT Anion Gap, Venous 13.0 10.0 - 25.0 mmol/L    Patient Temperature 37.0 degrees Celsius    FiO2 21 %   Troponin I, High Sensitivity   Result Value Ref Range    Troponin I, High Sensitivity 50 (H) 0 - 13 ng/L   Sars-CoV-2 PCR   Result Value Ref Range    Coronavirus 2019, PCR Not Detected Not Detected   Influenza A, and B PCR   Result Value Ref Range    Flu A Result Not Detected Not Detected    Flu B Result Not Detected Not Detected   RSV PCR   Result Value Ref Range    RSV PCR Not Detected Not Detected   Urinalysis with Reflex Culture and Microscopic   Result Value Ref Range    Color, Urine Yellow Light-Yellow, Yellow, Dark-Yellow    Appearance, Urine Ex.Turbid (N) Clear    Specific Gravity, Urine 1.012 1.005 - 1.035    pH, Urine 6.0 5.0, 5.5, 6.0, 6.5, 7.0, 7.5, 8.0    Protein, Urine 50 (1+) (A) NEGATIVE, 10 (TRACE), 20 (TRACE) mg/dL    Glucose, Urine Normal Normal mg/dL    Blood, Urine 0.06 (1+) (A) NEGATIVE    Ketones, Urine NEGATIVE NEGATIVE mg/dL    Bilirubin, Urine NEGATIVE NEGATIVE    Urobilinogen, Urine Normal Normal mg/dL    Nitrite, Urine NEGATIVE NEGATIVE    Leukocyte Esterase, Urine 500 Blayne/µL (A) NEGATIVE   Microscopic Only, Urine   Result Value Ref Range    WBC, Urine >50 (A) 1-5, NONE /HPF    WBC Clumps, Urine RARE Reference range not established. /HPF    RBC, Urine 3-5 NONE, 1-2, 3-5 /HPF    Bacteria, Urine 3+ (A) NONE SEEN /HPF    Mucus, Urine FEW Reference range not established. /LPF     CT abdomen pelvis w IV contrast    Result Date: 12/6/2024  STUDY: CT  Abdomen and Pelvis with IV Contrast; 12/06/2024 09:01 PM INDICATION: Sepsis.  Abdominal pain, fever, vomiting. COMPARISON: XR chest 12/06/2024.  CT chest 02/10/2022.  ACCESSION NUMBER(S): [id number] ORDERING CLINICIAN: ERASMO SANTIAGO TECHNIQUE: CT of the abdomen and pelvis was performed.  Contiguous axial images were obtained at 3 mm slice thickness through the abdomen and pelvis. Coronal and sagittal reconstructions at 3 mm slice thickness were performed.  Omnipaque 350:75 mL was administered intravenously.  FINDINGS: LOWER CHEST: No cardiomegaly.  No pericardial effusion.  Lung bases are clear.  ABDOMEN:  LIVER: No hepatomegaly.  Smooth surface contour.  Normal attenuation.  BILE DUCTS: No intrahepatic or extrahepatic biliary ductal dilatation.  GALLBLADDER: The gallbladder is unremarkable. STOMACH: No abnormalities identified.  PANCREAS: No masses or ductal dilatation. There are pancreatic calcifications.  SPLEEN: No splenomegaly or focal splenic lesion.  ADRENAL GLANDS: No thickening or nodules.  KIDNEYS AND URETERS: Kidneys are normal in size and location.  No renal or ureteral calculi.  PELVIS:  BLADDER: No abnormalities identified.  REPRODUCTIVE ORGANS: No abnormalities identified.  BOWEL: No abnormalities identified. The appendix is identified and is unremarkable. There are sigmoid diverticula, but no evidence for diverticulitis.  VESSELS: No abnormalities identified.  Abdominal aorta is normal in caliber.  PERITONEUM/RETROPERITONEUM/LYMPH NODES: No free fluid.  No pneumoperitoneum. No lymphadenopathy.  ABDOMINAL WALL: No abnormalities identified. SOFT TISSUES: No abnormalities identified.  BONES: No acute fracture or aggressive osseous lesion.    1. No acute pathology. 2. There are sigmoid diverticula, but no evidence for diverticulitis. 3. There are pancreatic calcifications suggesting chronic pancreatitis. Signed by Alfonso Sanon MD     Assessment/Plan   Assessment & Plan  Febrile illness    A 89  year old female with a past medical history of HTN, HLD, dementia, hypothyroidism, myelodysplastic syndrome, hx of bladder and skin cancer, who presented to [facility] ED today from home with abdominal pain, fever, and vomiting. UA consistent with UTI. Continue antibiotics. Patient will be hospitalized for further medical management.      Suspected UTI   Elevated troponin  Hypokalemia  Leukocytosis, mild  Abdominal pain  Nausea/vomiting  Tachycardia  Fever  Admit to inpatient/telemetry to Dr. Francisco Washington  See imaging results above   Continue vancomycin and zosyn  Replete potassium  Tylenol PRN  1L NS given in ED. Continue NS @75ml/hr x1L.  I&Os  Follow urine and blood cultures   Trend troponin (50)  PT/OT/SW  Repeat labs in AM    Chronic conditions   #HTN, HLD, dementia, hypothyroidism, myelodysplastic syndrome, hx of bladder cancer and skin cancer  Continue home meds as appropriate when med rec is completed.   Full code     DVT prophylaxis   Heparin SQ  SCD's as tolerated  Up in chair    Please call GIOVANI Padilla [phone] with questions, concerns, and plan of care.    I spent 40 minutes in the professional and overall care of this patient.    Janelle Driver, APRN-CNP

## 2024-12-07 NOTE — ED TRIAGE NOTES
BIBA from home. Caregiver reported  that when she went to check on patient, she threw up and then had a couple other episodes of emesis. Caregiver thinks she has the flu

## 2024-12-07 NOTE — ED PROVIDER NOTES
HPI   Chief Complaint   Patient presents with   • Flu Symptoms     BIBA from home. Caregiver reported  that when she went to check on patient, she threw up and then had a couple other episodes of emesis. Caregiver thinks she has the flu       HPI: []  89-year-old female history of hypertension, dementia, comes in with abdominal pain and fever and vomiting.  Home health care nurse noticed fever and emesis x 2.  Patient unfortunately unreliable historian unable to provide history.  No reported trauma falls cough congestion incontinence seizures syncope or near syncope, no history of reported recent travel hospitalization or antibiotic use.    Past history: Hypertension, dementia  Social: No history of reported tobacco alcohol drug abuse.  REVIEW OF SYSTEMS:    GENERAL.: No weight loss, fatigue, anorexia, insomnia, positive fever.    EYES: No vision loss, double vision, drainage, eye pain.    ENT: No pharyngitis, dry mouth.    CARDIOPULMONARY: No chest pain, palpitations, syncope, near syncope. No shortness of breath, cough, hemoptysis.    GI: No abdominal pain, change in bowel habits, melena, hematemesis, hematochezia, nausea, positive for vomiting, diarrhea.    : No discharge, dysuria, frequency, urgency, hematuria.    MS: No limb pain, joint pain, joint swelling.    SKIN: No rashes.    PSYCH: No depression, anxiety, suicidality, homicidality.    Review of systems is otherwise negative unless stated above or in history of present illness.  Social history, family history, allergies reviewed.  PHYSICAL EXAM:    GENERAL: Vitals noted, no distress. Alert and oriented  x 0. Non-toxic.      EENT: TMs clear. Posterior oropharynx unremarkable.  Edentulous, no meningismus. No LAD.     NECK: Supple. Nontender. No midline tenderness.     CARDIAC: Tachycardic regular, rate, rhythm.  Grade 2 x 6 ejection systolic murmur best heard at the left posterior border, no rubs or gallops. No JVD    PULMONARY: Lungs clear bilaterally  with good aeration. No wheezes rales or rhonchi. No respiratory distress.  Fine bibasilar crackles no tachypnea stridor or retractions     ABDOMEN: Soft, nonsurgical.  Tender mid abdomen no rebound or guarding negative Joyce sign and negative McBurney point tenderness no CVA tenderness no inguinal hernias.  No peritoneal signs. Normoactive bowel sounds. No pulsatile masses.     EXTREMITIES: No peripheral edema. Negative Homans bilaterally, no cords.  2+ bounding pulses well-perfused.    SKIN: No rash. Intact.     NEURO: No focal neurologic deficits, NIH score of 0. Cranial nerves normal as tested from II through XII.     MEDICAL DECISION MAKING:  EKG on my interpretation shows a sinus tachycardia normal axis rate about 115 with nonspecific ST and T wave change.    Labs including CBC with differential chemistry liver functions lactate lipase liver functions and urinalysis are pending chest x-ray pending.  COVID pending, CT abdomen pelvis pending    Treatments: IV established pancultured given nausea IV fluids intravenous Zosyn vancomycin oral Tylenol    ED course: Currently awaiting laboratory workup and imaging and response to treatment.    Impression: #1 altered mental status, #2 acute encephalopathy, #3 acute febrile illness, #4 abdominal pain with vomiting    Plan set MDM: 89-year-old female presents with a tachycardia fever concern for underlying infection or sepsis, low concern for septic shock, patient pancultured, antibiotic initiated, differential diagnosis include UTI diverticulitis appendicitis bowel obstruction pneumonia or COVID or viral illness.  Currently awaiting laboratory workup imaging results, patient was signed out to oncoming colleague pending above reevaluation and final disposition.  Anticipate patient will be hospitalized.              Patient History   No past medical history on file.  Past Surgical History:   Procedure Laterality Date   • OTHER SURGICAL HISTORY  12/09/2021    Tonsillectomy      No family history on file.  Social History     Tobacco Use   • Smoking status: Some Days     Types: Cigarettes   • Smokeless tobacco: Never   • Tobacco comments:     3 cigarettes   Substance Use Topics   • Alcohol use: Yes     Comment: occasional   • Drug use: Never       Physical Exam   ED Triage Vitals [12/06/24 1938]   Temperature Heart Rate Respirations BP   37.5 °C (99.5 °F) (!) 116 (!) 24 128/69      Pulse Ox Temp Source Heart Rate Source Patient Position   (!) 92 % Temporal Monitor Lying      BP Location FiO2 (%)     Right arm --       Physical Exam      ED Course & MDM   Diagnoses as of 12/06/24 2034   Febrile illness   Generalized abdominal pain   Nausea and vomiting, unspecified vomiting type   Tachycardia                 No data recorded     [place] Coma Scale Score: 12 (12/06/24 1936 : Karen Willingham RN)                           Medical Decision Making      Procedure  Procedures     Maximiliano Schilling MD  12/06/24 2034

## 2024-12-07 NOTE — H&P
History Of Present Illness  Tracey Viera is a 89 y.o. female with a past medical history of HTN, HLD, dementia, hypothyroidism, myelodysplastic syndrome, hx of bladder and skin cancer, who presented to [facility] ED today from home with abdominal pain, fever, and vomiting. Caregiver, Serena, at bedside stated patient had a fever and emesis x 1 today. Patient is a poor historian and unable to provide history, but at baseline mentation per caregiver. No reported trauma, falls, cough, congestion, incontinence, seizures, syncope or near syncope. No complaints of chest pain, shortness of breath, urinary symptoms, diarrhea, or constipation. No history of reported recent travel, hospitalization, or antibiotic use. POA stated patient is a full code.    ER Course: VS on ED arrival: 37.5C, /69, , RR 24, 92% on RA. EKG unavailable for my review. EKG: sinus tachycardia normal axis rate about 115 with nonspecific ST and T wave change, per ED physician. Labs: glucose 146. Potassium 3.1. Troponin 50. WBC 11.5, neutrophils 10.65. Covid-19/influenza/RSV negative. Blood gas: pH 7.50, pCO2 28, pO2 60, sO2 94. UA: turbid, 1+protein, trace blood, +leukocytes, 3+bacteria, WBC>50. Urine and blood cultures collected. See imaging results below.  Vancomycin, zosyn, Tylenol, and 1L NS given in ED. Pt will be admitted under the care of Dr. Francisco Calvert who will continue to follow. I was asked to H&P and place initial admission orders.      Past Medical History  As above    Surgical History  Past Surgical History:   Procedure Laterality Date    OTHER SURGICAL HISTORY  12/09/2021    Tonsillectomy        Social History  She reports that she has been smoking cigarettes. She has never used smokeless tobacco. She reports current alcohol use. She reports that she does not use drugs. Lives alone, has caretaker. Ambulates with walker/cane.    Family History  No family history on file.     Allergies  Patient has no known  "allergies.    Review of Systems  10 point review of systems negative except as stated in HPI.   Physical Exam  Constitutional:       Comments: Frail, elderly female who is hard of hearing (she needs to look at you and read your lips while talking)   HENT:      Head: Normocephalic and atraumatic.      Nose: Nose normal.      Mouth/Throat:      Mouth: Mucous membranes are moist.      Pharynx: Oropharynx is clear.   Eyes:      Extraocular Movements: Extraocular movements intact.      Conjunctiva/sclera: Conjunctivae normal.      Pupils: Pupils are equal, round, and reactive to light.   Cardiovascular:      Rate and Rhythm: Tachycardia present. Rhythm irregular.      Pulses: Normal pulses.      Heart sounds: Murmur heard.   Pulmonary:      Effort: Pulmonary effort is normal.      Breath sounds: Normal breath sounds.   Abdominal:      General: Abdomen is flat. Bowel sounds are normal.      Palpations: Abdomen is soft.      Tenderness: There is abdominal tenderness.      Comments: Tenderness to RLQ with palpation   Musculoskeletal:         General: Normal range of motion.      Cervical back: Normal range of motion and neck supple.      Comments: Non-tender hard mass to left hip on palpation.   Skin:     General: Skin is warm and dry.      Capillary Refill: Capillary refill takes less than 2 seconds.   Neurological:      General: No focal deficit present.      Mental Status: She is alert. Mental status is at baseline.   Psychiatric:         Mood and Affect: Mood normal.         Behavior: Behavior normal.          Last Recorded Vitals  Blood pressure 105/59, pulse 52, temperature 36.3 °C (97.3 °F), resp. rate (!) 23, height 1.64 m (5' 4.57\"), weight 58 kg (127 lb 13.9 oz), SpO2 96%.    Relevant Results  Results for orders placed or performed during the hospital encounter of 12/06/24 (from the past 24 hours)   CBC and Auto Differential   Result Value Ref Range    WBC 11.5 (H) 4.4 - 11.3 x10*3/uL    nRBC 0.0 0.0 - 0.0 /100 " WBCs    RBC 4.17 4.00 - 5.20 x10*6/uL    Hemoglobin 13.5 12.0 - 16.0 g/dL    Hematocrit 39.2 36.0 - 46.0 %    MCV 94 80 - 100 fL    MCH 32.4 26.0 - 34.0 pg    MCHC 34.4 32.0 - 36.0 g/dL    RDW 13.2 11.5 - 14.5 %    Platelets 209 150 - 450 x10*3/uL    Neutrophils % 92.5 40.0 - 80.0 %    Immature Granulocytes %, Automated 0.3 0.0 - 0.9 %    Lymphocytes % 1.8 13.0 - 44.0 %    Monocytes % 5.0 2.0 - 10.0 %    Eosinophils % 0.1 0.0 - 6.0 %    Basophils % 0.3 0.0 - 2.0 %    Neutrophils Absolute 10.65 (H) 1.60 - 5.50 x10*3/uL    Immature Granulocytes Absolute, Automated 0.04 0.00 - 0.50 x10*3/uL    Lymphocytes Absolute 0.21 (L) 0.80 - 3.00 x10*3/uL    Monocytes Absolute 0.58 0.05 - 0.80 x10*3/uL    Eosinophils Absolute 0.01 0.00 - 0.40 x10*3/uL    Basophils Absolute 0.03 0.00 - 0.10 x10*3/uL   Basic metabolic panel   Result Value Ref Range    Glucose 146 (H) 74 - 99 mg/dL    Sodium 136 136 - 145 mmol/L    Potassium 3.1 (L) 3.5 - 5.3 mmol/L    Chloride 103 98 - 107 mmol/L    Bicarbonate 22 21 - 32 mmol/L    Anion Gap 14 10 - 20 mmol/L    Urea Nitrogen 13 6 - 23 mg/dL    Creatinine 0.70 0.50 - 1.05 mg/dL    eGFR 81 >60 mL/min/1.73m*2    Calcium 8.7 8.6 - 10.3 mg/dL   Lipase   Result Value Ref Range    Lipase 38 9 - 82 U/L   Hepatic function panel   Result Value Ref Range    Albumin 3.5 3.4 - 5.0 g/dL    Bilirubin, Total 0.9 0.0 - 1.2 mg/dL    Bilirubin, Direct 0.2 0.0 - 0.3 mg/dL    Alkaline Phosphatase 99 33 - 136 U/L    ALT 14 7 - 45 U/L    AST 21 9 - 39 U/L    Total Protein 6.1 (L) 6.4 - 8.2 g/dL   Lactate   Result Value Ref Range    Lactate 1.6 0.4 - 2.0 mmol/L   Blood Gas Venous Full Panel   Result Value Ref Range    POCT pH, Venous 7.50 (H) 7.33 - 7.43 pH    POCT pCO2, Venous 28 (L) 41 - 51 mm Hg    POCT pO2, Venous 60 (H) 35 - 45 mm Hg    POCT SO2, Venous 94 (H) 45 - 75 %    POCT Oxy Hemoglobin, Venous 91.0 (H) 45.0 - 75.0 %    POCT Hematocrit Calculated, Venous 41.0 36.0 - 46.0 %    POCT Sodium, Venous 134 (L) 136 -  145 mmol/L    POCT Potassium, Venous 3.3 (L) 3.5 - 5.3 mmol/L    POCT Chloride, Venous 103 98 - 107 mmol/L    POCT Ionized Calicum, Venous 1.19 1.10 - 1.33 mmol/L    POCT Glucose, Venous 157 (H) 74 - 99 mg/dL    POCT Lactate, Venous 2.0 0.4 - 2.0 mmol/L    POCT Base Excess, Venous -0.2 -2.0 - 3.0 mmol/L    POCT HCO3 Calculated, Venous 21.8 (L) 22.0 - 26.0 mmol/L    POCT Hemoglobin, Venous 13.8 12.0 - 16.0 g/dL    POCT Anion Gap, Venous 13.0 10.0 - 25.0 mmol/L    Patient Temperature 37.0 degrees Celsius    FiO2 21 %   Troponin I, High Sensitivity   Result Value Ref Range    Troponin I, High Sensitivity 50 (H) 0 - 13 ng/L   Blood Culture    Specimen: Peripheral Venipuncture; Blood culture   Result Value Ref Range    Blood Culture Loaded on Instrument - Culture in progress    Blood Culture    Specimen: Peripheral Venipuncture; Blood culture   Result Value Ref Range    Blood Culture Loaded on Instrument - Culture in progress    Sars-CoV-2 PCR   Result Value Ref Range    Coronavirus 2019, PCR Not Detected Not Detected   Influenza A, and B PCR   Result Value Ref Range    Flu A Result Not Detected Not Detected    Flu B Result Not Detected Not Detected   RSV PCR   Result Value Ref Range    RSV PCR Not Detected Not Detected   Urinalysis with Reflex Culture and Microscopic   Result Value Ref Range    Color, Urine Yellow Light-Yellow, Yellow, Dark-Yellow    Appearance, Urine Ex.Turbid (N) Clear    Specific Gravity, Urine 1.012 1.005 - 1.035    pH, Urine 6.0 5.0, 5.5, 6.0, 6.5, 7.0, 7.5, 8.0    Protein, Urine 50 (1+) (A) NEGATIVE, 10 (TRACE), 20 (TRACE) mg/dL    Glucose, Urine Normal Normal mg/dL    Blood, Urine 0.06 (1+) (A) NEGATIVE    Ketones, Urine NEGATIVE NEGATIVE mg/dL    Bilirubin, Urine NEGATIVE NEGATIVE    Urobilinogen, Urine Normal Normal mg/dL    Nitrite, Urine NEGATIVE NEGATIVE    Leukocyte Esterase, Urine 500 Blayne/µL (A) NEGATIVE   Extra Urine Gray Tube   Result Value Ref Range    Extra Tube Hold for add-ons.     Microscopic Only, Urine   Result Value Ref Range    WBC, Urine >50 (A) 1-5, NONE /HPF    WBC Clumps, Urine RARE Reference range not established. /HPF    RBC, Urine 3-5 NONE, 1-2, 3-5 /HPF    Bacteria, Urine 3+ (A) NONE SEEN /HPF    Mucus, Urine FEW Reference range not established. /LPF   Troponin I, High Sensitivity   Result Value Ref Range    Troponin I, High Sensitivity 59 (HH) 0 - 13 ng/L   Troponin I, High Sensitivity   Result Value Ref Range    Troponin I, High Sensitivity 51 (HH) 0 - 13 ng/L   CBC   Result Value Ref Range    WBC 10.8 4.4 - 11.3 x10*3/uL    nRBC 0.0 0.0 - 0.0 /100 WBCs    RBC 3.60 (L) 4.00 - 5.20 x10*6/uL    Hemoglobin 11.6 (L) 12.0 - 16.0 g/dL    Hematocrit 34.5 (L) 36.0 - 46.0 %    MCV 96 80 - 100 fL    MCH 32.2 26.0 - 34.0 pg    MCHC 33.6 32.0 - 36.0 g/dL    RDW 13.5 11.5 - 14.5 %    Platelets 193 150 - 450 x10*3/uL   Basic metabolic panel   Result Value Ref Range    Glucose 130 (H) 74 - 99 mg/dL    Sodium 139 136 - 145 mmol/L    Potassium 4.1 3.5 - 5.3 mmol/L    Chloride 110 (H) 98 - 107 mmol/L    Bicarbonate 23 21 - 32 mmol/L    Anion Gap 10 10 - 20 mmol/L    Urea Nitrogen 13 6 - 23 mg/dL    Creatinine 0.65 0.50 - 1.05 mg/dL    eGFR 82 >60 mL/min/1.73m*2    Calcium 7.9 (L) 8.6 - 10.3 mg/dL   Troponin I, High Sensitivity   Result Value Ref Range    Troponin I, High Sensitivity 39 (H) 0 - 13 ng/L     CT abdomen pelvis w IV contrast    Result Date: 12/6/2024  STUDY: CT Abdomen and Pelvis with IV Contrast; 12/06/2024 09:01 PM INDICATION: Sepsis.  Abdominal pain, fever, vomiting. COMPARISON: XR chest 12/06/2024.  CT chest 02/10/2022.  ACCESSION NUMBER(S): [id number] ORDERING CLINICIAN: MOHAMMAD I PAMELA TECHNIQUE: CT of the abdomen and pelvis was performed.  Contiguous axial images were obtained at 3 mm slice thickness through the abdomen and pelvis. Coronal and sagittal reconstructions at 3 mm slice thickness were performed.  Omnipaque 350:75 mL was administered intravenously.  FINDINGS:  LOWER CHEST: No cardiomegaly.  No pericardial effusion.  Lung bases are clear.  ABDOMEN:  LIVER: No hepatomegaly.  Smooth surface contour.  Normal attenuation.  BILE DUCTS: No intrahepatic or extrahepatic biliary ductal dilatation.  GALLBLADDER: The gallbladder is unremarkable. STOMACH: No abnormalities identified.  PANCREAS: No masses or ductal dilatation. There are pancreatic calcifications.  SPLEEN: No splenomegaly or focal splenic lesion.  ADRENAL GLANDS: No thickening or nodules.  KIDNEYS AND URETERS: Kidneys are normal in size and location.  No renal or ureteral calculi.  PELVIS:  BLADDER: No abnormalities identified.  REPRODUCTIVE ORGANS: No abnormalities identified.  BOWEL: No abnormalities identified. The appendix is identified and is unremarkable. There are sigmoid diverticula, but no evidence for diverticulitis.  VESSELS: No abnormalities identified.  Abdominal aorta is normal in caliber.  PERITONEUM/RETROPERITONEUM/LYMPH NODES: No free fluid.  No pneumoperitoneum. No lymphadenopathy.  ABDOMINAL WALL: No abnormalities identified. SOFT TISSUES: No abnormalities identified.  BONES: No acute fracture or aggressive osseous lesion.    1. No acute pathology. 2. There are sigmoid diverticula, but no evidence for diverticulitis. 3. There are pancreatic calcifications suggesting chronic pancreatitis. Signed by Alfonso Sanon MD     Assessment/Plan   Assessment & Plan  Febrile illness    A 89 year old female with a past medical history of HTN, HLD, dementia, hypothyroidism, myelodysplastic syndrome, hx of bladder and skin cancer, who presented to [facility] ED today from home with abdominal pain, fever, and vomiting. UA consistent with UTI. Continue antibiotics. Patient will be hospitalized for further medical management.      UTI   Elevated troponin  Hypokalemia  Leukocytosis, mild  Abdominal pain  Nausea/vomiting  Tachycardia  Fever  Fluid collection lateral hip  Admit to inpatient/telemetry to Dr. Singh  Felix  See imaging results above   Continue vancomycin and zosyn  Replete potassium  Tylenol PRN  1L NS given in ED. Continue NS @75ml/hr x1L.  I&Os  Follow urine and blood cultures   Trend troponin (50)  PT/OT/SW  Repeat labs in AM  Ortho consulted, appreciate recs    Chronic conditions   #HTN, HLD, dementia, hypothyroidism, myelodysplastic syndrome, hx of bladder cancer and skin cancer  Continue home meds as appropriate when med rec is completed.   Full code     DVT prophylaxis   Heparin SQ  SCD's as tolerated  Up in chair    Please call GIOVANI Padilla [phone] with questions, concerns, and plan of care.    I spent 40 minutes in the professional and overall care of this patient.    Francisco Washington, DO

## 2024-12-07 NOTE — PROGRESS NOTES
Vancomycin Dosing by Pharmacy- INITIAL    Tracey Viera is a 89 y.o. year old female who Pharmacy has been consulted for vancomycin dosing for UTI. Based on the patient's indication and renal status this patient will be dosed based on a goal AUC of 400-600.     Renal function is currently stable.    Visit Vitals  BP 99/55   Pulse 91   Temp 37.5 °C (99.5 °F) (Temporal)   Resp (!) 23        Lab Results   Component Value Date    CREATININE 0.70 2024    CREATININE 0.51 2022    CREATININE 0.49 (L) 2022    CREATININE 0.57 02/10/2022    CREATININE 0.58 2021        Patient weight is as follows:   Vitals:    24 1938   Weight: 58 kg (127 lb 13.9 oz)       Temp (24hrs), Av.5 °C (99.5 °F), Min:37.5 °C (99.5 °F), Max:37.5 °C (99.5 °F)         Assessment/Plan     Patient has already been given a dose of vancomycin 1,000 mg in the ED on 24 @ 2100.  Will initiate vancomycin maintenance,  1,000 mg every 24 hours.    This dosing regimen is predicted by [employer] to result in the following pharmacokinetic parameters:    OWO94-40: 413 mg/L.hr  AUC24,ss: 461 mg/L.hr  Probability of AUC24 > 400: 67 %  Ctrough,ss: 13.7 mg/L  Probability of Ctrough,ss > 20: 16 %    Follow-up level will be ordered on 24 with am labs unless clinically indicated sooner.  Will continue to monitor renal function daily while on vancomycin and order serum creatinine at least every 48 hours if not already ordered.  Follow for continued vancomycin needs, clinical response, and signs/symptoms of toxicity.       Ricardo Gary, PharmD

## 2024-12-07 NOTE — CONSULTS
"Reason For Consult  LEFT HIP SWELLING    History Of Present Illness  Tracey Viera is a 89 y.o. female presenting with LEFT HIP SWELLING.  HX NOT OBTAINABLE FROM PT BUT APPEARS COMFORTABLE     Past Medical History  She has no past medical history on file.    Surgical History  She has a past surgical history that includes Other surgical history (12/09/2021).     Social History  She reports that she has been smoking cigarettes. She has never used smokeless tobacco. She reports current alcohol use. She reports that she does not use drugs.    Family History  No family history on file.     Allergies  Patient has no known allergies.    Review of Systems       Physical Exam  PAINLESS ROM LEFT HIP.  HAS ABOUT 6X6 CM FIRM CYSTIC LESION LAT LEFT HIP WHICH IS  NONTENDER TO PALPATION..  NV INTACT DISTAL     Last Recorded Vitals  Blood pressure 105/59, pulse 52, temperature 36.3 °C (97.3 °F), resp. rate (!) 23, height 1.64 m (5' 4.57\"), weight 58 kg (127 lb 13.9 oz), SpO2 96%.    Relevant Results  CT  SCAN/XRAYS HSOW WHAT APPEARS TO BE CYSTIC LESION LATERAL TO GREATER TROCHANTER.  RADIOLOGIST MAKES NO MENTION     Assessment/Plan     IMP- PROBABLE CYST LEFT HIP    PLAN- IN LIGHT OF  MINIMAL SX WOULD OBSREVE FOR NOW.  ASPIRATION COULD BE TRIED AT SOME POINT OR MRI FOR FURTHER EVAL.  APPEARS TO BE A BENIGN SWELLING  WILL SEE PRN    THANK YOU      Elian Ashraf MD    "

## 2024-12-07 NOTE — CARE PLAN
The patient's goals for the shift include      The clinical goals for the shift include pt to remain HDS and free of falls during this shift    Over the shift, the patient did not make progress toward the following goals. Barriers to progression include . Recommendations to address these barriers include .

## 2024-12-08 VITALS
TEMPERATURE: 98.8 F | SYSTOLIC BLOOD PRESSURE: 172 MMHG | RESPIRATION RATE: 16 BRPM | OXYGEN SATURATION: 96 % | WEIGHT: 127.87 LBS | HEART RATE: 72 BPM | BODY MASS INDEX: 21.3 KG/M2 | DIASTOLIC BLOOD PRESSURE: 81 MMHG | HEIGHT: 65 IN

## 2024-12-08 LAB
ANION GAP SERPL CALC-SCNC: 9 MMOL/L (ref 10–20)
BACTERIA BLD CULT: NORMAL
BACTERIA BLD CULT: NORMAL
BACTERIA UR CULT: NORMAL
BUN SERPL-MCNC: 9 MG/DL (ref 6–23)
CALCIUM SERPL-MCNC: 8 MG/DL (ref 8.6–10.3)
CHLORIDE SERPL-SCNC: 112 MMOL/L (ref 98–107)
CO2 SERPL-SCNC: 21 MMOL/L (ref 21–32)
CREAT SERPL-MCNC: 0.66 MG/DL (ref 0.5–1.05)
EGFRCR SERPLBLD CKD-EPI 2021: 82 ML/MIN/1.73M*2
ERYTHROCYTE [DISTWIDTH] IN BLOOD BY AUTOMATED COUNT: 13.6 % (ref 11.5–14.5)
GLUCOSE SERPL-MCNC: 135 MG/DL (ref 74–99)
HCT VFR BLD AUTO: 35.4 % (ref 36–46)
HGB BLD-MCNC: 11.7 G/DL (ref 12–16)
MCH RBC QN AUTO: 32.3 PG (ref 26–34)
MCHC RBC AUTO-ENTMCNC: 33.1 G/DL (ref 32–36)
MCV RBC AUTO: 98 FL (ref 80–100)
NRBC BLD-RTO: 0 /100 WBCS (ref 0–0)
PLATELET # BLD AUTO: 185 X10*3/UL (ref 150–450)
POTASSIUM SERPL-SCNC: 3.3 MMOL/L (ref 3.5–5.3)
RBC # BLD AUTO: 3.62 X10*6/UL (ref 4–5.2)
SODIUM SERPL-SCNC: 139 MMOL/L (ref 136–145)
VANCOMYCIN SERPL-MCNC: 10.3 UG/ML (ref 5–20)
WBC # BLD AUTO: 6.7 X10*3/UL (ref 4.4–11.3)

## 2024-12-08 PROCEDURE — 2500000002 HC RX 250 W HCPCS SELF ADMINISTERED DRUGS (ALT 637 FOR MEDICARE OP, ALT 636 FOR OP/ED): Performed by: INTERNAL MEDICINE

## 2024-12-08 PROCEDURE — 36415 COLL VENOUS BLD VENIPUNCTURE: CPT | Performed by: INTERNAL MEDICINE

## 2024-12-08 PROCEDURE — 85027 COMPLETE CBC AUTOMATED: CPT | Performed by: INTERNAL MEDICINE

## 2024-12-08 PROCEDURE — 80202 ASSAY OF VANCOMYCIN: CPT

## 2024-12-08 PROCEDURE — 2500000001 HC RX 250 WO HCPCS SELF ADMINISTERED DRUGS (ALT 637 FOR MEDICARE OP): Performed by: INTERNAL MEDICINE

## 2024-12-08 PROCEDURE — 99232 SBSQ HOSP IP/OBS MODERATE 35: CPT | Performed by: INTERNAL MEDICINE

## 2024-12-08 PROCEDURE — 80048 BASIC METABOLIC PNL TOTAL CA: CPT | Performed by: INTERNAL MEDICINE

## 2024-12-08 PROCEDURE — 1200000002 HC GENERAL ROOM WITH TELEMETRY DAILY

## 2024-12-08 PROCEDURE — 2500000004 HC RX 250 GENERAL PHARMACY W/ HCPCS (ALT 636 FOR OP/ED)

## 2024-12-08 PROCEDURE — 97161 PT EVAL LOW COMPLEX 20 MIN: CPT | Mod: GP

## 2024-12-08 RX ORDER — VANCOMYCIN HYDROCHLORIDE 1.25 G/250ML
1250 INJECTION, SOLUTION INTRAVITREAL EVERY 24 HOURS
Status: DISPENSED | OUTPATIENT
Start: 2024-12-08

## 2024-12-08 RX ORDER — POTASSIUM CHLORIDE 1.5 G/1.58G
40 POWDER, FOR SOLUTION ORAL ONCE
Status: COMPLETED | OUTPATIENT
Start: 2024-12-08 | End: 2024-12-08

## 2024-12-08 RX ADMIN — PIPERACILLIN SODIUM AND TAZOBACTAM SODIUM 3.38 G: 3; .375 INJECTION, SOLUTION INTRAVENOUS at 16:55

## 2024-12-08 RX ADMIN — PIPERACILLIN SODIUM AND TAZOBACTAM SODIUM 3.38 G: 3; .375 INJECTION, SOLUTION INTRAVENOUS at 10:30

## 2024-12-08 RX ADMIN — NYSTATIN 1 APPLICATION: 100000 POWDER TOPICAL at 20:53

## 2024-12-08 RX ADMIN — HEPARIN SODIUM 5000 UNITS: 5000 INJECTION INTRAVENOUS; SUBCUTANEOUS at 20:53

## 2024-12-08 RX ADMIN — VANCOMYCIN HYDROCHLORIDE 1250 MG: 1.25 INJECTION, SOLUTION INTRAVITREAL at 20:52

## 2024-12-08 RX ADMIN — PIPERACILLIN SODIUM AND TAZOBACTAM SODIUM 3.38 G: 3; .375 INJECTION, SOLUTION INTRAVENOUS at 20:52

## 2024-12-08 RX ADMIN — POTASSIUM CHLORIDE 40 MEQ: 1.5 POWDER, FOR SOLUTION ORAL at 16:55

## 2024-12-08 RX ADMIN — HEPARIN SODIUM 5000 UNITS: 5000 INJECTION INTRAVENOUS; SUBCUTANEOUS at 16:55

## 2024-12-08 RX ADMIN — HEPARIN SODIUM 5000 UNITS: 5000 INJECTION INTRAVENOUS; SUBCUTANEOUS at 06:42

## 2024-12-08 RX ADMIN — NYSTATIN 1 APPLICATION: 100000 POWDER TOPICAL at 08:50

## 2024-12-08 RX ADMIN — PIPERACILLIN SODIUM AND TAZOBACTAM SODIUM 3.38 G: 3; .375 INJECTION, SOLUTION INTRAVENOUS at 02:46

## 2024-12-08 RX ADMIN — LEVOTHYROXINE SODIUM 100 MCG: 0.1 TABLET ORAL at 06:42

## 2024-12-08 ASSESSMENT — PAIN SCALES - GENERAL
PAINLEVEL_OUTOF10: 0 - NO PAIN

## 2024-12-08 ASSESSMENT — COGNITIVE AND FUNCTIONAL STATUS - GENERAL
STANDING UP FROM CHAIR USING ARMS: A LITTLE
TURNING FROM BACK TO SIDE WHILE IN FLAT BAD: A LITTLE
MOBILITY SCORE: 20
WALKING IN HOSPITAL ROOM: A LITTLE
CLIMB 3 TO 5 STEPS WITH RAILING: A LOT
MOBILITY SCORE: 17
WALKING IN HOSPITAL ROOM: A LITTLE
MOVING TO AND FROM BED TO CHAIR: A LITTLE
STANDING UP FROM CHAIR USING ARMS: A LITTLE
CLIMB 3 TO 5 STEPS WITH RAILING: A LITTLE
MOVING FROM LYING ON BACK TO SITTING ON SIDE OF FLAT BED WITH BEDRAILS: A LITTLE
MOVING TO AND FROM BED TO CHAIR: A LITTLE

## 2024-12-08 ASSESSMENT — PAIN - FUNCTIONAL ASSESSMENT: PAIN_FUNCTIONAL_ASSESSMENT: 0-10

## 2024-12-08 NOTE — PROGRESS NOTES
Vancomycin Dosing by Pharmacy- FOLLOW UP    Tracey Viera is a 89 y.o. year old female who Pharmacy has been consulted for vancomycin dosing for other UTI . Based on the patient's indication and renal status this patient is being dosed based on a goal AUC of 400-600.     Renal function is currently stable.    Current vancomycin dose: 1000 mg given every 24 hours    Estimated vancomycin AUC on current dose: 351 mg/L.hr     Visit Vitals  /82 (BP Location: Right arm, Patient Position: Lying)   Pulse 86   Temp 35.9 °C (96.6 °F) (Temporal)   Resp 20        Lab Results   Component Value Date    CREATININE 0.66 2024    CREATININE 0.65 2024    CREATININE 0.70 2024    CREATININE 0.51 2022    CREATININE 0.49 (L) 2022    CREATININE 0.57 02/10/2022    CREATININE 0.58 2021        Patient weight is as follows:   Vitals:    24 1938   Weight: 58 kg (127 lb 13.9 oz)       Cultures:  No results found for the encounter in last 14 days.       I/O last 3 completed shifts:  In: 2944.2 (50.8 mL/kg) [I.V.:2390 (41.2 mL/kg); IV Piggyback:554.2]  Out: - (0 mL/kg)   Weight: 58 kg   I/O during current shift:  No intake/output data recorded.    Temp (24hrs), Av.1 °C (96.9 °F), Min:35.2 °C (95.4 °F), Max:36.6 °C (97.9 °F)      Assessment/Plan    Below goal AUC. Orders placed for new vancomcyin regimen of 1250 every 24 hours to begin at 2100 on 24.     This dosing regimen is predicted by [employer] to result in the following pharmacokinetic parameters:  Regimen: 1250 mg IV every 24 hours.  Start time: 21:25 on 2024  Exposure target: AUC24 (range)400-600 mg/L.hr   QYG05-23: 416 mg/L.hr  AUC24,ss: 483 mg/L.hr  Probability of AUC24 > 400: 81 %  Ctrough,ss: 14.7 mg/L  Probability of Ctrough,ss > 20: 19 %      The next level will be obtained on 24 at AM labs. May be obtained sooner if clinically indicated.   Will continue to monitor renal function daily while on vancomycin and order  serum creatinine at least every 48 hours if not already ordered.  Follow for continued vancomycin needs, clinical response, and signs/symptoms of toxicity.       Claudia Grayson, PharmD

## 2024-12-08 NOTE — PROGRESS NOTES
Tracey Viera is a 89 y.o. female on day 2 of admission presenting with Febrile illness.      Subjective   No events overnight. Patient seen and examined at bedside. She is more alert today but very confused.       Objective     Last Recorded Vitals  /71 (Patient Position: Sitting)   Pulse 66   Temp 36.1 °C (97 °F) (Temporal)   Resp 20   Wt 58 kg (127 lb 13.9 oz)   SpO2 92%   Intake/Output last 3 Shifts:    Intake/Output Summary (Last 24 hours) at 12/8/2024 1836  Last data filed at 12/8/2024 0646  Gross per 24 hour   Intake 775 ml   Output --   Net 775 ml       Admission Weight  Weight: 58 kg (127 lb 13.9 oz) (12/06/24 1938)    Daily Weight  12/06/24 : 58 kg (127 lb 13.9 oz)    Image Results  XR hip left with pelvis when performed 2 or 3 views  Narrative: Interpreted By:  Efren Santiago,   STUDY:  XR HIP LEFT WITH PELVIS WHEN PERFORMED 2 OR 3 VIEWS; ;  12/6/2024  10:23 pm      INDICATION:  Signs/Symptoms:mass on hip.      COMPARISON:  9/30/2019, CT 12/6/2024      ACCESSION NUMBER(S):  [id number]      ORDERING CLINICIAN:  MAGDALENE WHARTON      FINDINGS:  No evidence of acute displaced pelvic or left hip fracture. There is  osteopenia. Mild left hip osteoarthrosis. Degenerative change of the  imaged lower lumbar spine.      There is limited evaluation of the soft tissues on plain radiography.  On review of the 12/6/2024 CT examination, there is however evidence  of a 4.5 cm x 3.5 cm fluid collection in the lateral soft tissues  abutting the proximal left femur.      Impression: No evidence of acute displaced pelvic fracture.      There is limited evaluation of the soft tissues on plain radiography.  On review of the 12/6/2024 CT examination, there is however evidence  of a 4.5 cm x 3.5 cm fluid collection in the lateral soft tissues  abutting the proximal left femur. The finding may correspond to a  subacute hematoma/seroma versus infected fluid collection not  excluded in the appropriate clinical  setting, and clinical  correlation recommended and further evaluation.      MACRO:  None      Signed by: Efren Santiago 12/6/2024 11:16 PM  Dictation workstation:   [id number]  CT abdomen pelvis w IV contrast  Narrative: STUDY:  CT Abdomen and Pelvis with IV Contrast; 12/06/2024 09:01 PM   INDICATION:  Sepsis.  Abdominal pain, fever, vomiting.  COMPARISON:  XR chest 12/06/2024.  CT chest 02/10/2022.    ACCESSION NUMBER(S):  [id number]  ORDERING CLINICIAN:  ERASMO SANTIAGO  TECHNIQUE:  CT of the abdomen and pelvis was performed.  Contiguous axial images  were obtained at 3 mm slice thickness through the abdomen and pelvis.   Coronal and sagittal reconstructions at 3 mm slice thickness were  performed.  Omnipaque 350:75 mL was administered intravenously.    FINDINGS:  LOWER CHEST:  No cardiomegaly.  No pericardial effusion.  Lung bases are clear.     ABDOMEN:     LIVER:  No hepatomegaly.  Smooth surface contour.  Normal attenuation.     BILE DUCTS:  No intrahepatic or extrahepatic biliary ductal dilatation.     GALLBLADDER:  The gallbladder is unremarkable.  STOMACH:  No abnormalities identified.     PANCREAS:  No masses or ductal dilatation. There are pancreatic calcifications.     SPLEEN:  No splenomegaly or focal splenic lesion.     ADRENAL GLANDS:  No thickening or nodules.     KIDNEYS AND URETERS:  Kidneys are normal in size and location.  No renal or ureteral  calculi.     PELVIS:     BLADDER:  No abnormalities identified.     REPRODUCTIVE ORGANS:  No abnormalities identified.     BOWEL:  No abnormalities identified. The appendix is identified and is  unremarkable. There are sigmoid diverticula, but no evidence for  diverticulitis.     VESSELS:  No abnormalities identified.  Abdominal aorta is normal in caliber.      PERITONEUM/RETROPERITONEUM/LYMPH NODES:  No free fluid.  No pneumoperitoneum.  No lymphadenopathy.     ABDOMINAL WALL:  No abnormalities identified.  SOFT TISSUES:   No abnormalities  identified.     BONES:  No acute fracture or aggressive osseous lesion.  Impression: 1. No acute pathology.  2. There are sigmoid diverticula, but no evidence for diverticulitis.  3. There are pancreatic calcifications suggesting chronic  pancreatitis.  Signed by Alfonso Sanon MD  XR chest 1 view  Narrative: STUDY:  Chest Radiograph;  12/6/2024 at 8:06 PM.  INDICATION:  Fever.  COMPARISON:  CT chest 2/10/2022.  ACCESSION NUMBER(S):  [id number]  ORDERING CLINICIAN:  ERASMO SANTIAGO  TECHNIQUE:  Frontal chest was obtained at 20:06 hours.  FINDINGS:  CARDIOMEDIASTINAL SILHOUETTE:  The cardiac silhouette is normal in size.  Tortuosity of the thoracic  aorta appears stable as compared with the previous CT topogram.     LUNGS:  Lungs are largely clear with a few calcified granuloma.     ABDOMEN:  No remarkable upper abdominal findings.     BONES:  No acute osseous changes.  Prominent thoracolumbar scoliosis was also  present previously.  Impression: Normal heart size with no signs of active pulmonary parenchymal  infiltration. Findings similar to the prior.  Signed by Sasha Whitaker DO      Physical Exam  Constitutional:       Comments: Frail, elderly female who is hard of hearing (she needs to look at you and read your lips while talking)   HENT:      Head: Normocephalic and atraumatic.      Nose: Nose normal.      Mouth/Throat:      Mouth: Mucous membranes are moist.      Pharynx: Oropharynx is clear.   Eyes:      Extraocular Movements: Extraocular movements intact.      Conjunctiva/sclera: Conjunctivae normal.      Pupils: Pupils are equal, round, and reactive to light.   Cardiovascular:      Rate and Rhythm: Tachycardia present. Rhythm irregular.      Pulses: Normal pulses.      Heart sounds: Murmur heard.   Pulmonary:      Effort: Pulmonary effort is normal.      Breath sounds: Normal breath sounds.   Abdominal:      General: Abdomen is flat. Bowel sounds are normal.      Palpations: Abdomen is soft.       Tenderness: There is abdominal tenderness.      Comments: Tenderness to RLQ with palpation   Musculoskeletal:         General: Normal range of motion.      Cervical back: Normal range of motion and neck supple.      Comments: Non-tender hard mass to left hip on palpation.   Skin:     General: Skin is warm and dry.      Capillary Refill: Capillary refill takes less than 2 seconds.   Neurological:      General: No focal deficit present.      Mental Status: She is alert. Mental status is at baseline.     Relevant Results               Assessment/Plan                  Assessment & Plan  Febrile illness    A 89 year old female with a past medical history of HTN, HLD, dementia, hypothyroidism, myelodysplastic syndrome, hx of bladder and skin cancer, who presented to [facility] ED today from home with abdominal pain, fever, and vomiting. UA consistent with UTI. Continue antibiotics. Patient will be hospitalized for further medical management.       UTI   Elevated troponin  Hypokalemia  Leukocytosis, mild  Abdominal pain  Nausea/vomiting  Tachycardia  Fever  Fluid collection lateral hip  Admit to inpatient/telemetry to Dr. Francisco Washington  See imaging results above   Continue vancomycin and zosyn  Replete potassium  Tylenol PRN  1L NS given in ED. Continue NS @75ml/hr x1L.  I&Os  Follow urine and blood cultures   Trend troponin (50)  PT/OT/SW  Repeat labs in AM  Ortho consulted, appreciate recs     Chronic conditions   #HTN, HLD, dementia, hypothyroidism, myelodysplastic syndrome, hx of bladder cancer and skin cancer  Continue home meds as appropriate when med rec is completed.   Full code     DVT prophylaxis   Heparin SQ  SCD's as tolerated  Up in chair    12/8: Urine culture pending. Continue IV antibiotics. Replete potassium.              Francisco Washington, DO

## 2024-12-08 NOTE — PROGRESS NOTES
Physical Therapy    Physical Therapy Evaluation    Patient Name: Tracey Viera  MRN: [id number]  Today's Date: 12/8/2024   Time Calculation  Start Time: 0922  Stop Time: 0938  Time Calculation (min): 16 min  621/621-A    Assessment/Plan   PT Assessment  PT Assessment Results: Decreased strength, Impaired balance, Decreased mobility, Decreased cognition, Impaired judgement, Decreased safety awareness, Impaired hearing  Rehab Prognosis: Good  Barriers to Discharge: lives alone, questionable prior level of function & support system  Evaluation/Treatment Tolerance: Patient tolerated treatment well  End of Session Communication: PCT/NA/CTA  Assessment Comment: Per EMR review, pt lives alone/support of caregive at unknown level.  Pt not at functional base line for living alone, cognitive status & decreased mobility limiting. Continued skilled PT intervention indicated to facilitate increased strength, balance & gait stability  End of Session Patient Position: Bed, 2 rail up, Alarm off, caregiver present (bedside sitter)  IP OR SWING BED PT PLAN  Inpatient or Swing Bed: Inpatient  PT Plan  Treatment/Interventions: Bed mobility, Transfer training, Gait training, Balance training, Therapeutic exercise, Therapeutic activity  PT Plan: Ongoing PT  PT Frequency: 3 times per week  PT Discharge Recommendations: Moderate intensity level of continued care  PT Recommended Transfer Status: Assist x1  PT - OK to Discharge: Yes (when cleared by medical team)    Subjective     Current Problem:  1. Febrile illness        2. Generalized abdominal pain        3. Nausea and vomiting, unspecified vomiting type        4. Tachycardia          Patient Active Problem List   Diagnosis    Advanced age    Forgetfulness    Wound of left leg    Wrist injury, right, initial encounter    After-cataract obscuring vision    AK (actinic keratosis)    Atherosclerosis of both carotid arteries    Balance problem    Bladder cancer (Multi)    Cigarette smoker  one half pack a day or less    Circumscribed scleroderma    Closed fracture of left olecranon process    Diverticulosis of colon    Essential hypertension    Hyperlipidemia LDL goal <100    Impaired fasting glucose    Malignant neoplasm of skin    Laryngopharyngeal reflux (LPR)    Mild aortic insufficiency    Nonrheumatic aortic valve stenosis    Nontoxic multinodular goiter    Osteopenia    Pseudophakia    Subclinical hypothyroidism    Tubular adenoma of colon    Vitamin D deficiency    Weakness    Febrile illness       General Visit Information:  General  Reason for Referral: PT eval & treat/impaired mobility DX: uti, elevated troponin, hypopkalemia, leukocytosis; 12/6/24 report of abdominal pain, n/v, fever; cxr (-) acute; ctap: pancreatic calcifications; lt hip/pelvis xray: fluid collection lateral soft tissues proximal to lt femur- ortho: probable cyst, no intervention at this time  Referred By: Janelle Driver CNP  Caregiver Feedback: Per conference w/ RN patient stable to participate in therapy  Patient Position Received: Bed, 2 rail up, Alarm off, caregiver present (pt w/ bedside sitter)  General Comment: Pleasant & cooperative, receptive to mobility& instructions, confused but able to follow one step commands, distractible, decreased word finding, denies dizziness    Home Living:  Home Living  Home Living Comments: due to cognitive status, pt unable to provide prior level of function or home environment, per EMR review pt lives at home alone w/ support of caregiver Serena  Precautions:  Precautions  Precautions Comment: fall, sittertony  Pain:  Pain Assessment  0-10 (Numeric) Pain Score: 0 - No pain    Cognition:  Cognition  Overall Cognitive Status:  (oriented to self)    General Assessments:       Sensation  Light Touch: No apparent deficits   Functional Assessments:     Bed Mobility  Bed Mobility:  (cga supine>sit, sba sit>supine)  Transfers  Transfer:  (sba sit<>stand)  Ambulation/Gait  Training  Ambulation/Gait Training Performed:  (cga w/ ww 30ft, cues for attention to task & safe position to ww)   Extremity/Trunk Assessments:        RLE   RLE :  (arom grossly wfl, strength grossly 4/5)  LLE   LLE :  (arom grossly wfl, strength grossly 4/5)    Outcome Measures:     [facility] Basic Mobility  Turning from your back to your side while in a flat bed without using bedrails: A little  Moving from lying on your back to sitting on the side of a flat bed without using bedrails: A little  Moving to and from bed to chair (including a wheelchair): A little  Standing up from a chair using your arms (e.g. wheelchair or bedside chair): A little  To walk in hospital room: A little  Climbing 3-5 steps with railing: A lot  Basic Mobility - Total Score: 17     Goals:  Encounter Problems       Encounter Problems (Active)       PT Problem       STG - Pt will transition supine <> sitting with supervision  (Progressing)       Start:  12/08/24    Expected End:  12/22/24            STG - Pt will transfer STS with supervision   (Progressing)       Start:  12/08/24    Expected End:  12/22/24            STG - Pt will amb >=40' x2 using ww with sba  (Progressing)       Start:  12/08/24    Expected End:  12/22/24            STG - Pt will perform a B LE ther ex program of 2-3 sets of 10  (Progressing)       Start:  12/08/24    Expected End:  12/22/24               Pain - Adult          Safety       LTG - Patient will demonstrate safety requirements appropriate to situation/environment       Start:  12/07/24            LTG - Patient will utilize safety techniques       Start:  12/07/24            STG - Patient uses call light consistently to request assistance with transfers       Start:  12/07/24                 Education Documentation  Mobility Training, taught by Lyndsey Nevarez PT at 12/8/2024  1:24 PM.  Learner: Patient  Readiness: Acceptance  Method: Explanation  Response: Verbalizes Understanding  Comment: safety, activity  progression, use of ww

## 2024-12-08 NOTE — PROGRESS NOTES
Tracey Viera is a 89 y.o. female on day 2 of admission presenting with Febrile illness.      Subjective   No events overnight. Patient seen and examined at bedside. She is more alert today but very confused.       Objective     Last Recorded Vitals  /71 (Patient Position: Sitting)   Pulse 66   Temp 36.1 °C (97 °F) (Temporal)   Resp 20   Wt 58 kg (127 lb 13.9 oz)   SpO2 92%   Intake/Output last 3 Shifts:    Intake/Output Summary (Last 24 hours) at 12/8/2024 1832  Last data filed at 12/8/2024 0646  Gross per 24 hour   Intake 775 ml   Output --   Net 775 ml       Admission Weight  Weight: 58 kg (127 lb 13.9 oz) (12/06/24 1938)    Daily Weight  12/06/24 : 58 kg (127 lb 13.9 oz)    Image Results  XR hip left with pelvis when performed 2 or 3 views  Narrative: Interpreted By:  Efren Santiago,   STUDY:  XR HIP LEFT WITH PELVIS WHEN PERFORMED 2 OR 3 VIEWS; ;  12/6/2024  10:23 pm      INDICATION:  Signs/Symptoms:mass on hip.      COMPARISON:  9/30/2019, CT 12/6/2024      ACCESSION NUMBER(S):  [id number]      ORDERING CLINICIAN:  MAGDALENE WHARTON      FINDINGS:  No evidence of acute displaced pelvic or left hip fracture. There is  osteopenia. Mild left hip osteoarthrosis. Degenerative change of the  imaged lower lumbar spine.      There is limited evaluation of the soft tissues on plain radiography.  On review of the 12/6/2024 CT examination, there is however evidence  of a 4.5 cm x 3.5 cm fluid collection in the lateral soft tissues  abutting the proximal left femur.      Impression: No evidence of acute displaced pelvic fracture.      There is limited evaluation of the soft tissues on plain radiography.  On review of the 12/6/2024 CT examination, there is however evidence  of a 4.5 cm x 3.5 cm fluid collection in the lateral soft tissues  abutting the proximal left femur. The finding may correspond to a  subacute hematoma/seroma versus infected fluid collection not  excluded in the appropriate clinical  setting, and clinical  correlation recommended and further evaluation.      MACRO:  None      Signed by: Efren Santiago 12/6/2024 11:16 PM  Dictation workstation:   [id number]  CT abdomen pelvis w IV contrast  Narrative: STUDY:  CT Abdomen and Pelvis with IV Contrast; 12/06/2024 09:01 PM   INDICATION:  Sepsis.  Abdominal pain, fever, vomiting.  COMPARISON:  XR chest 12/06/2024.  CT chest 02/10/2022.    ACCESSION NUMBER(S):  [id number]  ORDERING CLINICIAN:  ERASMO SANTIAGO  TECHNIQUE:  CT of the abdomen and pelvis was performed.  Contiguous axial images  were obtained at 3 mm slice thickness through the abdomen and pelvis.   Coronal and sagittal reconstructions at 3 mm slice thickness were  performed.  Omnipaque 350:75 mL was administered intravenously.    FINDINGS:  LOWER CHEST:  No cardiomegaly.  No pericardial effusion.  Lung bases are clear.     ABDOMEN:     LIVER:  No hepatomegaly.  Smooth surface contour.  Normal attenuation.     BILE DUCTS:  No intrahepatic or extrahepatic biliary ductal dilatation.     GALLBLADDER:  The gallbladder is unremarkable.  STOMACH:  No abnormalities identified.     PANCREAS:  No masses or ductal dilatation. There are pancreatic calcifications.     SPLEEN:  No splenomegaly or focal splenic lesion.     ADRENAL GLANDS:  No thickening or nodules.     KIDNEYS AND URETERS:  Kidneys are normal in size and location.  No renal or ureteral  calculi.     PELVIS:     BLADDER:  No abnormalities identified.     REPRODUCTIVE ORGANS:  No abnormalities identified.     BOWEL:  No abnormalities identified. The appendix is identified and is  unremarkable. There are sigmoid diverticula, but no evidence for  diverticulitis.     VESSELS:  No abnormalities identified.  Abdominal aorta is normal in caliber.      PERITONEUM/RETROPERITONEUM/LYMPH NODES:  No free fluid.  No pneumoperitoneum.  No lymphadenopathy.     ABDOMINAL WALL:  No abnormalities identified.  SOFT TISSUES:   No abnormalities  identified.     BONES:  No acute fracture or aggressive osseous lesion.  Impression: 1. No acute pathology.  2. There are sigmoid diverticula, but no evidence for diverticulitis.  3. There are pancreatic calcifications suggesting chronic  pancreatitis.  Signed by Alfonso Sanon MD  XR chest 1 view  Narrative: STUDY:  Chest Radiograph;  12/6/2024 at 8:06 PM.  INDICATION:  Fever.  COMPARISON:  CT chest 2/10/2022.  ACCESSION NUMBER(S):  [id number]  ORDERING CLINICIAN:  ERASMO SANTIAGO  TECHNIQUE:  Frontal chest was obtained at 20:06 hours.  FINDINGS:  CARDIOMEDIASTINAL SILHOUETTE:  The cardiac silhouette is normal in size.  Tortuosity of the thoracic  aorta appears stable as compared with the previous CT topogram.     LUNGS:  Lungs are largely clear with a few calcified granuloma.     ABDOMEN:  No remarkable upper abdominal findings.     BONES:  No acute osseous changes.  Prominent thoracolumbar scoliosis was also  present previously.  Impression: Normal heart size with no signs of active pulmonary parenchymal  infiltration. Findings similar to the prior.  Signed by Sasha Whitaker DO      Physical Exam  Constitutional:       Comments: Frail, elderly female who is hard of hearing (she needs to look at you and read your lips while talking)   HENT:      Head: Normocephalic and atraumatic.      Nose: Nose normal.      Mouth/Throat:      Mouth: Mucous membranes are moist.      Pharynx: Oropharynx is clear.   Eyes:      Extraocular Movements: Extraocular movements intact.      Conjunctiva/sclera: Conjunctivae normal.      Pupils: Pupils are equal, round, and reactive to light.   Cardiovascular:      Rate and Rhythm: Tachycardia present. Rhythm irregular.      Pulses: Normal pulses.      Heart sounds: Murmur heard.   Pulmonary:      Effort: Pulmonary effort is normal.      Breath sounds: Normal breath sounds.   Abdominal:      General: Abdomen is flat. Bowel sounds are normal.      Palpations: Abdomen is soft.       Tenderness: There is abdominal tenderness.      Comments: Tenderness to RLQ with palpation   Musculoskeletal:         General: Normal range of motion.      Cervical back: Normal range of motion and neck supple.      Comments: Non-tender hard mass to left hip on palpation.   Skin:     General: Skin is warm and dry.      Capillary Refill: Capillary refill takes less than 2 seconds.   Neurological:      General: No focal deficit present.      Mental Status: She is alert. Mental status is at baseline.     Relevant Results               Assessment/Plan                  Assessment & Plan  Febrile illness    A 89 year old female with a past medical history of HTN, HLD, dementia, hypothyroidism, myelodysplastic syndrome, hx of bladder and skin cancer, who presented to [facility] ED today from home with abdominal pain, fever, and vomiting. UA consistent with UTI. Continue antibiotics. Patient will be hospitalized for further medical management.       UTI   Elevated troponin  Hypokalemia  Leukocytosis, mild  Abdominal pain  Nausea/vomiting  Tachycardia  Fever  Fluid collection lateral hip  Admit to inpatient/telemetry to Dr. Francisco Washington  See imaging results above   Continue vancomycin and zosyn  Replete potassium  Tylenol PRN  1L NS given in ED. Continue NS @75ml/hr x1L.  I&Os  Follow urine and blood cultures   Trend troponin (50)  PT/OT/SW  Repeat labs in AM  Ortho consulted, appreciate recs     Chronic conditions   #HTN, HLD, dementia, hypothyroidism, myelodysplastic syndrome, hx of bladder cancer and skin cancer  Continue home meds as appropriate when med rec is completed.   Full code     DVT prophylaxis   Heparin SQ  SCD's as tolerated  Up in chair    12/8: Urine culture pending. Continue IV antibiotics. Replete potassium.              Francisco Washington, DO

## 2024-12-09 LAB
ANION GAP SERPL CALC-SCNC: 9 MMOL/L (ref 10–20)
BUN SERPL-MCNC: 7 MG/DL (ref 6–23)
CALCIUM SERPL-MCNC: 8.2 MG/DL (ref 8.6–10.3)
CHLORIDE SERPL-SCNC: 110 MMOL/L (ref 98–107)
CO2 SERPL-SCNC: 24 MMOL/L (ref 21–32)
CREAT SERPL-MCNC: 0.55 MG/DL (ref 0.5–1.05)
EGFRCR SERPLBLD CKD-EPI 2021: 86 ML/MIN/1.73M*2
ERYTHROCYTE [DISTWIDTH] IN BLOOD BY AUTOMATED COUNT: 13.5 % (ref 11.5–14.5)
GLUCOSE SERPL-MCNC: 98 MG/DL (ref 74–99)
HCT VFR BLD AUTO: 34.2 % (ref 36–46)
HGB BLD-MCNC: 11.5 G/DL (ref 12–16)
MCH RBC QN AUTO: 32.4 PG (ref 26–34)
MCHC RBC AUTO-ENTMCNC: 33.6 G/DL (ref 32–36)
MCV RBC AUTO: 96 FL (ref 80–100)
NRBC BLD-RTO: 0 /100 WBCS (ref 0–0)
PLATELET # BLD AUTO: 204 X10*3/UL (ref 150–450)
POTASSIUM SERPL-SCNC: 3.5 MMOL/L (ref 3.5–5.3)
RBC # BLD AUTO: 3.55 X10*6/UL (ref 4–5.2)
SODIUM SERPL-SCNC: 139 MMOL/L (ref 136–145)
VANCOMYCIN SERPL-MCNC: 14.5 UG/ML (ref 5–20)
WBC # BLD AUTO: 5.3 X10*3/UL (ref 4.4–11.3)

## 2024-12-09 PROCEDURE — 99232 SBSQ HOSP IP/OBS MODERATE 35: CPT | Performed by: INTERNAL MEDICINE

## 2024-12-09 PROCEDURE — 97535 SELF CARE MNGMENT TRAINING: CPT | Mod: GO

## 2024-12-09 PROCEDURE — 80048 BASIC METABOLIC PNL TOTAL CA: CPT | Performed by: INTERNAL MEDICINE

## 2024-12-09 PROCEDURE — 2500000005 HC RX 250 GENERAL PHARMACY W/O HCPCS: Performed by: NURSE PRACTITIONER

## 2024-12-09 PROCEDURE — 36415 COLL VENOUS BLD VENIPUNCTURE: CPT | Performed by: INTERNAL MEDICINE

## 2024-12-09 PROCEDURE — 85027 COMPLETE CBC AUTOMATED: CPT | Performed by: INTERNAL MEDICINE

## 2024-12-09 PROCEDURE — 2500000002 HC RX 250 W HCPCS SELF ADMINISTERED DRUGS (ALT 637 FOR MEDICARE OP, ALT 636 FOR OP/ED): Performed by: INTERNAL MEDICINE

## 2024-12-09 PROCEDURE — 80202 ASSAY OF VANCOMYCIN: CPT

## 2024-12-09 PROCEDURE — 1200000002 HC GENERAL ROOM WITH TELEMETRY DAILY

## 2024-12-09 PROCEDURE — 2500000004 HC RX 250 GENERAL PHARMACY W/ HCPCS (ALT 636 FOR OP/ED)

## 2024-12-09 PROCEDURE — 97165 OT EVAL LOW COMPLEX 30 MIN: CPT | Mod: GO

## 2024-12-09 RX ORDER — LIDOCAINE 560 MG/1
1 PATCH PERCUTANEOUS; TOPICAL; TRANSDERMAL DAILY
Status: DISCONTINUED | OUTPATIENT
Start: 2024-12-09 | End: 2024-12-10 | Stop reason: HOSPADM

## 2024-12-09 RX ADMIN — PIPERACILLIN SODIUM AND TAZOBACTAM SODIUM 3.38 G: 3; .375 INJECTION, SOLUTION INTRAVENOUS at 09:32

## 2024-12-09 RX ADMIN — PIPERACILLIN SODIUM AND TAZOBACTAM SODIUM 3.38 G: 3; .375 INJECTION, SOLUTION INTRAVENOUS at 22:33

## 2024-12-09 RX ADMIN — HEPARIN SODIUM 5000 UNITS: 5000 INJECTION INTRAVENOUS; SUBCUTANEOUS at 14:09

## 2024-12-09 RX ADMIN — PIPERACILLIN SODIUM AND TAZOBACTAM SODIUM 3.38 G: 3; .375 INJECTION, SOLUTION INTRAVENOUS at 15:20

## 2024-12-09 RX ADMIN — HEPARIN SODIUM 5000 UNITS: 5000 INJECTION INTRAVENOUS; SUBCUTANEOUS at 20:25

## 2024-12-09 RX ADMIN — VANCOMYCIN HYDROCHLORIDE 1250 MG: 1.25 INJECTION, SOLUTION INTRAVITREAL at 20:30

## 2024-12-09 RX ADMIN — NYSTATIN 1 APPLICATION: 100000 POWDER TOPICAL at 20:33

## 2024-12-09 RX ADMIN — PIPERACILLIN SODIUM AND TAZOBACTAM SODIUM 3.38 G: 3; .375 INJECTION, SOLUTION INTRAVENOUS at 03:15

## 2024-12-09 RX ADMIN — LIDOCAINE 4% 1 PATCH: 40 PATCH TOPICAL at 14:09

## 2024-12-09 RX ADMIN — NYSTATIN 1 APPLICATION: 100000 POWDER TOPICAL at 09:38

## 2024-12-09 RX ADMIN — LEVOTHYROXINE SODIUM 100 MCG: 0.1 TABLET ORAL at 06:05

## 2024-12-09 RX ADMIN — POLYETHYLENE GLYCOL 3350 17 G: 17 POWDER, FOR SOLUTION ORAL at 09:38

## 2024-12-09 ASSESSMENT — ACTIVITIES OF DAILY LIVING (ADL)
HOME_MANAGEMENT_TIME_ENTRY: 16
HOME_MANAGEMENT_TIME_ENTRY: 16

## 2024-12-09 ASSESSMENT — COGNITIVE AND FUNCTIONAL STATUS - GENERAL
HELP NEEDED FOR BATHING: A LOT
DRESSING REGULAR LOWER BODY CLOTHING: A LOT
WALKING IN HOSPITAL ROOM: A LITTLE
DAILY ACTIVITIY SCORE: 18
PERSONAL GROOMING: A LITTLE
DRESSING REGULAR UPPER BODY CLOTHING: A LITTLE
MOBILITY SCORE: 19
DRESSING REGULAR UPPER BODY CLOTHING: A LITTLE
DRESSING REGULAR LOWER BODY CLOTHING: A LITTLE
PERSONAL GROOMING: A LITTLE
MOVING TO AND FROM BED TO CHAIR: A LITTLE
DAILY ACTIVITIY SCORE: 16
STANDING UP FROM CHAIR USING ARMS: A LITTLE
TOILETING: A LITTLE
TOILETING: A LITTLE
CLIMB 3 TO 5 STEPS WITH RAILING: A LITTLE
PERSONAL GROOMING: A LITTLE
DAILY ACTIVITIY SCORE: 19
WALKING IN HOSPITAL ROOM: A LITTLE
DRESSING REGULAR LOWER BODY CLOTHING: A LITTLE
TURNING FROM BACK TO SIDE WHILE IN FLAT BAD: A LITTLE
DRESSING REGULAR UPPER BODY CLOTHING: A LITTLE
MOVING TO AND FROM BED TO CHAIR: A LITTLE
EATING MEALS: A LITTLE
HELP NEEDED FOR BATHING: A LITTLE
HELP NEEDED FOR BATHING: A LITTLE
STANDING UP FROM CHAIR USING ARMS: A LITTLE
TOILETING: A LOT
CLIMB 3 TO 5 STEPS WITH RAILING: A LITTLE
MOBILITY SCORE: 19
TURNING FROM BACK TO SIDE WHILE IN FLAT BAD: A LITTLE

## 2024-12-09 ASSESSMENT — PAIN SCALES - GENERAL
PAINLEVEL_OUTOF10: 0 - NO PAIN
PAINLEVEL_OUTOF10: 0 - NO PAIN

## 2024-12-09 NOTE — PROGRESS NOTES
Vancomycin Dosing by Pharmacy- FOLLOW UP    Tracey Viera is a 89 y.o. year old female who Pharmacy has been consulted for vancomycin dosing for UTI. Based on the patient's indication and renal status this patient is being dosed based on a goal AUC of 400-600.     Renal function is currently stable.    Current vancomycin dose: 1250 mg given every 24 hours    Estimated vancomycin AUC on current dose: 413 mg/L.hr     Visit Vitals  /70   Pulse 77   Temp 36.8 °C (98.2 °F)   Resp 16        Lab Results   Component Value Date    CREATININE 0.55 2024    CREATININE 0.66 2024    CREATININE 0.65 2024    CREATININE 0.70 2024        Patient weight is as follows:   Vitals:    24 1938   Weight: 58 kg (127 lb 13.9 oz)       Cultures:  Susceptibility data for the encounter in last 14 days.  Collected Specimen Info Organism   24 Urine from Clean Catch/Voided Enteric bacilli        I/O last 3 completed shifts:  In: 775 (13.4 mL/kg) [I.V.:775 (13.4 mL/kg)]  Out: - (0 mL/kg)   Weight: 58 kg   I/O during current shift:  No intake/output data recorded.    Temp (24hrs), Av.7 °C (98.1 °F), Min:36.1 °C (97 °F), Max:37.2 °C (99 °F)      Assessment/Plan    Within goal AUC range. Continue current vancomycin regimen.    This dosing regimen is predicted by [employer] to result in the following pharmacokinetic parameters:  CMA60-26: 396 mg/L.hr  AUC24,ss: 413 mg/L.hr  Probability of AUC24 > 400: 57 %  Ctrough,ss: 11.8 mg/L  Probability of Ctrough,ss > 20: 8 %    The next level will be obtained on 12 at AM labs. May be obtained sooner if clinically indicated.   Will continue to monitor renal function daily while on vancomycin and order serum creatinine at least every 48 hours if not already ordered.  Follow for continued vancomycin needs, clinical response, and signs/symptoms of toxicity.       Daniel J Weiland, PharmD

## 2024-12-09 NOTE — PROGRESS NOTES
Tracey Viera is a 89 y.o. female on day 3 of admission presenting with Febrile illness.      Subjective   No events overnight. Patient seen and examined at bedside. She is alert and oriented to name only. She has a sitter.       Objective     Last Recorded Vitals  /62   Pulse 72   Temp 37.3 °C (99.1 °F)   Resp 16   Wt 58 kg (127 lb 13.9 oz)   SpO2 97%   Intake/Output last 3 Shifts:    Intake/Output Summary (Last 24 hours) at 12/9/2024 1650  Last data filed at 12/9/2024 1622  Gross per 24 hour   Intake 1200 ml   Output --   Net 1200 ml       Admission Weight  Weight: 58 kg (127 lb 13.9 oz) (12/06/24 1938)    Daily Weight  12/09/24 : 58 kg (127 lb 13.9 oz)    Image Results  XR hip left with pelvis when performed 2 or 3 views  Narrative: Interpreted By:  Efren Santiago,   STUDY:  XR HIP LEFT WITH PELVIS WHEN PERFORMED 2 OR 3 VIEWS; ;  12/6/2024  10:23 pm      INDICATION:  Signs/Symptoms:mass on hip.      COMPARISON:  9/30/2019, CT 12/6/2024      ACCESSION NUMBER(S):  [id number]      ORDERING CLINICIAN:  MAGDALENE WHARTON      FINDINGS:  No evidence of acute displaced pelvic or left hip fracture. There is  osteopenia. Mild left hip osteoarthrosis. Degenerative change of the  imaged lower lumbar spine.      There is limited evaluation of the soft tissues on plain radiography.  On review of the 12/6/2024 CT examination, there is however evidence  of a 4.5 cm x 3.5 cm fluid collection in the lateral soft tissues  abutting the proximal left femur.      Impression: No evidence of acute displaced pelvic fracture.      There is limited evaluation of the soft tissues on plain radiography.  On review of the 12/6/2024 CT examination, there is however evidence  of a 4.5 cm x 3.5 cm fluid collection in the lateral soft tissues  abutting the proximal left femur. The finding may correspond to a  subacute hematoma/seroma versus infected fluid collection not  excluded in the appropriate clinical setting, and  clinical  correlation recommended and further evaluation.      MACRO:  None      Signed by: Efren Santiago 12/6/2024 11:16 PM  Dictation workstation:   [id number]  CT abdomen pelvis w IV contrast  Narrative: STUDY:  CT Abdomen and Pelvis with IV Contrast; 12/06/2024 09:01 PM   INDICATION:  Sepsis.  Abdominal pain, fever, vomiting.  COMPARISON:  XR chest 12/06/2024.  CT chest 02/10/2022.    ACCESSION NUMBER(S):  [id number]  ORDERING CLINICIAN:  ERASMO SANTIAGO  TECHNIQUE:  CT of the abdomen and pelvis was performed.  Contiguous axial images  were obtained at 3 mm slice thickness through the abdomen and pelvis.   Coronal and sagittal reconstructions at 3 mm slice thickness were  performed.  Omnipaque 350:75 mL was administered intravenously.    FINDINGS:  LOWER CHEST:  No cardiomegaly.  No pericardial effusion.  Lung bases are clear.     ABDOMEN:     LIVER:  No hepatomegaly.  Smooth surface contour.  Normal attenuation.     BILE DUCTS:  No intrahepatic or extrahepatic biliary ductal dilatation.     GALLBLADDER:  The gallbladder is unremarkable.  STOMACH:  No abnormalities identified.     PANCREAS:  No masses or ductal dilatation. There are pancreatic calcifications.     SPLEEN:  No splenomegaly or focal splenic lesion.     ADRENAL GLANDS:  No thickening or nodules.     KIDNEYS AND URETERS:  Kidneys are normal in size and location.  No renal or ureteral  calculi.     PELVIS:     BLADDER:  No abnormalities identified.     REPRODUCTIVE ORGANS:  No abnormalities identified.     BOWEL:  No abnormalities identified. The appendix is identified and is  unremarkable. There are sigmoid diverticula, but no evidence for  diverticulitis.     VESSELS:  No abnormalities identified.  Abdominal aorta is normal in caliber.      PERITONEUM/RETROPERITONEUM/LYMPH NODES:  No free fluid.  No pneumoperitoneum.  No lymphadenopathy.     ABDOMINAL WALL:  No abnormalities identified.  SOFT TISSUES:   No abnormalities identified.      BONES:  No acute fracture or aggressive osseous lesion.  Impression: 1. No acute pathology.  2. There are sigmoid diverticula, but no evidence for diverticulitis.  3. There are pancreatic calcifications suggesting chronic  pancreatitis.  Signed by Alfonso Sanon MD  XR chest 1 view  Narrative: STUDY:  Chest Radiograph;  12/6/2024 at 8:06 PM.  INDICATION:  Fever.  COMPARISON:  CT chest 2/10/2022.  ACCESSION NUMBER(S):  [id number]  ORDERING CLINICIAN:  ERASMO SANTIAGO  TECHNIQUE:  Frontal chest was obtained at 20:06 hours.  FINDINGS:  CARDIOMEDIASTINAL SILHOUETTE:  The cardiac silhouette is normal in size.  Tortuosity of the thoracic  aorta appears stable as compared with the previous CT topogram.     LUNGS:  Lungs are largely clear with a few calcified granuloma.     ABDOMEN:  No remarkable upper abdominal findings.     BONES:  No acute osseous changes.  Prominent thoracolumbar scoliosis was also  present previously.  Impression: Normal heart size with no signs of active pulmonary parenchymal  infiltration. Findings similar to the prior.  Signed by Sasha Whitaker DO      Physical Exam  Constitutional:       Comments: Frail, elderly female who is hard of hearing (she needs to look at you and read your lips while talking)   HENT:      Head: Normocephalic and atraumatic.      Nose: Nose normal.      Mouth/Throat:      Mouth: Mucous membranes are moist.      Pharynx: Oropharynx is clear.   Eyes:      Extraocular Movements: Extraocular movements intact.      Conjunctiva/sclera: Conjunctivae normal.      Pupils: Pupils are equal, round, and reactive to light.   Cardiovascular:      Rate and Rhythm: Tachycardia present. Rhythm irregular.      Pulses: Normal pulses.      Heart sounds: Murmur heard.   Pulmonary:      Effort: Pulmonary effort is normal.      Breath sounds: Normal breath sounds.   Abdominal:      General: Abdomen is flat. Bowel sounds are normal.      Palpations: Abdomen is soft.      Tenderness: There is  abdominal tenderness.      Comments: Tenderness to RLQ with palpation   Musculoskeletal:         General: Normal range of motion.      Cervical back: Normal range of motion and neck supple.      Comments: Non-tender hard mass to left hip on palpation.   Skin:     General: Skin is warm and dry.      Capillary Refill: Capillary refill takes less than 2 seconds.   Neurological:      General: No focal deficit present.      Mental Status: She is alert. Mental status is at baseline.     Relevant Results               Assessment/Plan                  Assessment & Plan  Febrile illness    A 89 year old female with a past medical history of HTN, HLD, dementia, hypothyroidism, myelodysplastic syndrome, hx of bladder and skin cancer, who presented to [facility] ED today from home with abdominal pain, fever, and vomiting. UA consistent with UTI. Continue antibiotics. Patient will be hospitalized for further medical management.       UTI   Elevated troponin  Hypokalemia  Leukocytosis, mild  Abdominal pain  Nausea/vomiting  Tachycardia  Fever  Fluid collection lateral hip  Admit to inpatient/telemetry to Dr. Francisco Washington  See imaging results above   Continue vancomycin and zosyn  Replete potassium  Tylenol PRN  1L NS given in ED. Continue NS @75ml/hr x1L.  I&Os  Follow urine and blood cultures   Trend troponin (50)  PT/OT/SW  Repeat labs in AM  Ortho consulted, appreciate recs     Chronic conditions   #HTN, HLD, dementia, hypothyroidism, myelodysplastic syndrome, hx of bladder cancer and skin cancer  Continue home meds as appropriate when med rec is completed.   Full code     DVT prophylaxis   Heparin SQ  SCD's as tolerated  Up in chair    12/8: Urine culture pending. Continue IV antibiotics. Replete potassium.    12/9: Urine culture growing Enteric bacilli. Continue IV antibiotics. Dispo pending, home with daughter vs SNF.            Francisco Washington, DO

## 2024-12-09 NOTE — CARE PLAN
The patient's goals for the shift include      The clinical goals for the shift include remain fall free      Problem: Pain - Adult  Goal: Verbalizes/displays adequate comfort level or baseline comfort level  Outcome: Progressing     Problem: Safety - Adult  Goal: Free from fall injury  Outcome: Progressing     Problem: Discharge Planning  Goal: Discharge to home or other facility with appropriate resources  Outcome: Progressing     Problem: Chronic Conditions and Co-morbidities  Goal: Patient's chronic conditions and co-morbidity symptoms are monitored and maintained or improved  Outcome: Progressing     Problem: Skin  Goal: Decreased wound size/increased tissue granulation at next dressing change  Outcome: Progressing  Flowsheets (Taken 12/9/2024 1205)  Decreased wound size/increased tissue granulation at next dressing change: Promote sleep for wound healing  Goal: Participates in plan/prevention/treatment measures  Outcome: Progressing  Flowsheets (Taken 12/9/2024 1205)  Participates in plan/prevention/treatment measures: Elevate heels  Goal: Prevent/manage excess moisture  Outcome: Progressing  Flowsheets (Taken 12/9/2024 1205)  Prevent/manage excess moisture: Monitor for/manage infection if present  Goal: Prevent/minimize sheer/friction injuries  Outcome: Progressing  Flowsheets (Taken 12/9/2024 1205)  Prevent/minimize sheer/friction injuries: Use pull sheet  Goal: Promote/optimize nutrition  Outcome: Progressing  Flowsheets (Taken 12/9/2024 1205)  Promote/optimize nutrition: Consume > 50% meals/supplements  Goal: Promote skin healing  Outcome: Progressing  Flowsheets (Taken 12/9/2024 1205)  Promote skin healing: Turn/reposition every 2 hours/use positioning/transfer devices     Problem: Fall/Injury  Goal: Not fall by end of shift  Outcome: Progressing  Goal: Be free from injury by end of the shift  Outcome: Progressing  Goal: Verbalize understanding of personal risk factors for fall in the hospital  Outcome:  Progressing  Goal: Verbalize understanding of risk factor reduction measures to prevent injury from fall in the home  Outcome: Progressing  Goal: Use assistive devices by end of the shift  Outcome: Progressing  Goal: Pace activities to prevent fatigue by end of the shift  Outcome: Progressing

## 2024-12-10 ENCOUNTER — DOCUMENTATION (OUTPATIENT)
Dept: HOME HEALTH SERVICES | Facility: HOME HEALTH | Age: 89
End: 2024-12-10
Payer: MEDICARE

## 2024-12-10 ENCOUNTER — HOME HEALTH ADMISSION (OUTPATIENT)
Dept: HOME HEALTH SERVICES | Facility: HOME HEALTH | Age: 89
End: 2024-12-10
Payer: MEDICARE

## 2024-12-10 VITALS
WEIGHT: 127.87 LBS | DIASTOLIC BLOOD PRESSURE: 78 MMHG | RESPIRATION RATE: 16 BRPM | HEART RATE: 61 BPM | BODY MASS INDEX: 21.3 KG/M2 | TEMPERATURE: 98.1 F | HEIGHT: 65 IN | OXYGEN SATURATION: 96 % | SYSTOLIC BLOOD PRESSURE: 133 MMHG

## 2024-12-10 PROBLEM — N30.00 ACUTE CYSTITIS WITHOUT HEMATURIA: Status: ACTIVE | Noted: 2024-12-10

## 2024-12-10 LAB
ANION GAP SERPL CALC-SCNC: 10 MMOL/L (ref 10–20)
BACTERIA UR CULT: ABNORMAL
BUN SERPL-MCNC: 8 MG/DL (ref 6–23)
CALCIUM SERPL-MCNC: 8.5 MG/DL (ref 8.6–10.3)
CHLORIDE SERPL-SCNC: 109 MMOL/L (ref 98–107)
CO2 SERPL-SCNC: 25 MMOL/L (ref 21–32)
CREAT SERPL-MCNC: 0.69 MG/DL (ref 0.5–1.05)
EGFRCR SERPLBLD CKD-EPI 2021: 81 ML/MIN/1.73M*2
ERYTHROCYTE [DISTWIDTH] IN BLOOD BY AUTOMATED COUNT: 13.6 % (ref 11.5–14.5)
GLUCOSE SERPL-MCNC: 112 MG/DL (ref 74–99)
HCT VFR BLD AUTO: 36.9 % (ref 36–46)
HGB BLD-MCNC: 12.2 G/DL (ref 12–16)
MCH RBC QN AUTO: 31.7 PG (ref 26–34)
MCHC RBC AUTO-ENTMCNC: 33.1 G/DL (ref 32–36)
MCV RBC AUTO: 96 FL (ref 80–100)
NRBC BLD-RTO: 0 /100 WBCS (ref 0–0)
PLATELET # BLD AUTO: 212 X10*3/UL (ref 150–450)
POTASSIUM SERPL-SCNC: 3.7 MMOL/L (ref 3.5–5.3)
RBC # BLD AUTO: 3.85 X10*6/UL (ref 4–5.2)
SODIUM SERPL-SCNC: 140 MMOL/L (ref 136–145)
WBC # BLD AUTO: 5.4 X10*3/UL (ref 4.4–11.3)

## 2024-12-10 PROCEDURE — 2500000005 HC RX 250 GENERAL PHARMACY W/O HCPCS: Performed by: NURSE PRACTITIONER

## 2024-12-10 PROCEDURE — 2500000002 HC RX 250 W HCPCS SELF ADMINISTERED DRUGS (ALT 637 FOR MEDICARE OP, ALT 636 FOR OP/ED): Performed by: INTERNAL MEDICINE

## 2024-12-10 PROCEDURE — 82565 ASSAY OF CREATININE: CPT | Performed by: INTERNAL MEDICINE

## 2024-12-10 PROCEDURE — 2500000004 HC RX 250 GENERAL PHARMACY W/ HCPCS (ALT 636 FOR OP/ED)

## 2024-12-10 PROCEDURE — 36415 COLL VENOUS BLD VENIPUNCTURE: CPT | Performed by: INTERNAL MEDICINE

## 2024-12-10 PROCEDURE — 99238 HOSP IP/OBS DSCHRG MGMT 30/<: CPT | Performed by: INTERNAL MEDICINE

## 2024-12-10 PROCEDURE — 85027 COMPLETE CBC AUTOMATED: CPT | Performed by: INTERNAL MEDICINE

## 2024-12-10 RX ORDER — AMOXICILLIN AND CLAVULANATE POTASSIUM 875; 125 MG/1; MG/1
875 TABLET, FILM COATED ORAL 2 TIMES DAILY
Qty: 10 TABLET | Refills: 0 | Status: SHIPPED | OUTPATIENT
Start: 2024-12-10 | End: 2024-12-18 | Stop reason: WASHOUT

## 2024-12-10 RX ADMIN — HEPARIN SODIUM 5000 UNITS: 5000 INJECTION INTRAVENOUS; SUBCUTANEOUS at 05:31

## 2024-12-10 RX ADMIN — LIDOCAINE 4% 1 PATCH: 40 PATCH TOPICAL at 08:13

## 2024-12-10 RX ADMIN — LEVOTHYROXINE SODIUM 100 MCG: 0.1 TABLET ORAL at 05:33

## 2024-12-10 RX ADMIN — POLYETHYLENE GLYCOL 3350 17 G: 17 POWDER, FOR SOLUTION ORAL at 08:13

## 2024-12-10 RX ADMIN — PIPERACILLIN SODIUM AND TAZOBACTAM SODIUM 3.38 G: 3; .375 INJECTION, SOLUTION INTRAVENOUS at 17:10

## 2024-12-10 RX ADMIN — HEPARIN SODIUM 5000 UNITS: 5000 INJECTION INTRAVENOUS; SUBCUTANEOUS at 13:29

## 2024-12-10 RX ADMIN — NYSTATIN 1 APPLICATION: 100000 POWDER TOPICAL at 08:14

## 2024-12-10 RX ADMIN — PIPERACILLIN SODIUM AND TAZOBACTAM SODIUM 3.38 G: 3; .375 INJECTION, SOLUTION INTRAVENOUS at 04:11

## 2024-12-10 RX ADMIN — PIPERACILLIN SODIUM AND TAZOBACTAM SODIUM 3.38 G: 3; .375 INJECTION, SOLUTION INTRAVENOUS at 11:29

## 2024-12-10 ASSESSMENT — PAIN SCALES - GENERAL: PAINLEVEL_OUTOF10: 0 - NO PAIN

## 2024-12-10 ASSESSMENT — COGNITIVE AND FUNCTIONAL STATUS - GENERAL
HELP NEEDED FOR BATHING: A LOT
WALKING IN HOSPITAL ROOM: A LOT
DRESSING REGULAR LOWER BODY CLOTHING: A LOT
PERSONAL GROOMING: A LOT
DAILY ACTIVITIY SCORE: 14
CLIMB 3 TO 5 STEPS WITH RAILING: A LOT
MOVING FROM LYING ON BACK TO SITTING ON SIDE OF FLAT BED WITH BEDRAILS: A LITTLE
DRESSING REGULAR UPPER BODY CLOTHING: A LOT
STANDING UP FROM CHAIR USING ARMS: A LITTLE
TURNING FROM BACK TO SIDE WHILE IN FLAT BAD: A LITTLE
MOBILITY SCORE: 15
MOVING TO AND FROM BED TO CHAIR: A LOT
TOILETING: A LOT

## 2024-12-10 NOTE — HH CARE COORDINATION
Home Care received a Referral for Nursing, Physical Therapy, Occupational Therapy, and Home Health Aide. We have processed the referral for a Start of Care on 12/11-12/13.     If you have any questions or concerns, please feel free to contact us at [phone]. Follow the prompts, enter your five digit zip code, and you will be directed to your care team on WEST 3.

## 2024-12-10 NOTE — PROGRESS NOTES
12/10/24 7953   Discharge Planning   Living Arrangements Alone   Support Systems Other (Comment);Friends/neighbors   Type of Residence Private residence   Number of Stairs to Enter Residence 1   Number of Stairs Within Residence 24   Do you have animals or pets at home? Yes   Type of Animals or Pets cats   Expected Discharge Disposition Home H   Does the patient need discharge transport arranged? No     I met with patient and her caregiver Serena at the bedside.  Patient lives alone.  Caregiver Serena goes to patient's house daily at [phone], assists with bathing, meal prep, supervision.  Serena leaves in the afternoon while patient is napping (3-4 hours) and returns in the evening and stays with patient until she goes to sleep at night.  Patient sleeps on her couch as bedrooms are upstairs and she does not go up stairs unless Serena is present; home has a basement which is reportedly blocked off.  Patient's friend/neighbor/Durable Power of  Madhuri lives next door and checks on patient frequently.  Her other friend Rufina Johnston is [facility].  I called, spoke with Rufina Johnston, [facility] and PT/OT recommendations and Rufina Johnston refused SNF, stated patient is more confused than normal because she is in a strange environment and when she gets home she will be back to normal.  Madhuri Dave, other friends/relatives visit frequently and assist as needed as well.  Rufina Johnston is agreeable to [facility].  She declined a list of agencies, verbalized preference for [facility]; referral entered.  Salas ANDERSON TCC

## 2024-12-10 NOTE — PROGRESS NOTES
Occupational Therapy    Evaluation/Treatment    Patient Name: Tracey Viera  MRN: [id number]  Department: [facility]  Room: [address] [place] [address]  Today's Date: 12/9/2024  Time Calculation  Start Time: 1550  Stop Time: 1636  Time Calculation (min): 46 min    Assessment  IP OT Assessment  OT Assessment: Pt demosntrates a decline in adl/functional mobility. Recommend low intensity OT tx intervention at 2x/week  Prognosis: Good (for goal attainment)  Plan:  Treatment Interventions: ADL retraining, Functional transfer training, Cognitive reorientation  OT Frequency: 3 times per week  OT Discharge Recommendations: Moderate intensity level of continued care  OT - OK to Discharge:  (okay to discharge to next level of care pending medical  team clearance)    Subjective   Current Problem:  1. Febrile illness        2. Generalized abdominal pain        3. Nausea and vomiting, unspecified vomiting type        4. Tachycardia          General:  General  Prior to Session Communication: Bedside nurse (sitter present)  Patient Position Received: Bed, 4 rail up, Alarm off, not on at start of session, Alarm off, caregiver present  Precautions:  Precautions Comment: confusion/[place]    Vital Sign (Past 2hrs)        Date/Time Vitals Session Patient Position Pulse Resp SpO2 BP MAP (mmHg)    12/09/24 20:03:09 --  --  65  18  93 %  141/70  99                        Pain:  Pain Assessment  0-10 (Numeric) Pain Score: 0 - No pain    Objective   Cognition:  Orientation Level:  (Oriented x 1)           Home Living:  Home Living Comments: Pt is a poor historian/ Home health involvement.   Prior Function:     IADL History:  IADL Comments: [facility] inviolvment  ADL:  ADL Comments: Pt complains of dicomfort, but cannot quantify. Pt also c/o of dizziness.Nsg staff aware  Activity Tolerance:     Bed Mobility/Transfers: Bed Mobility  Bed Mobility:  (min a supine to sit  and sit tos supine)    Transfers  Transfer:  (sit to stand cga)      Functional Mobility:  Functional  Mobility  Functional Mobility Performed:  (cga/min a  with wheeled walker and moderate verbal cues for safety)  Modalities:     IADL's:   IADL Comments: [facility] inviolvment  Vision: Vision - Basic Assessment  Current Vision: No visual deficits  Sensation:wfl     Strength:  Strength Comments: strength below elbows wfl  Perception:wfl     Coordination:wfl      Hand Function:  Hand Function  Gross Grasp: Functional  Extremities: RUE   RUE : Within Functional Limits and LUE   LUE: Within Functional Limits    Outcome Measures: [facility] Daily Activity  Putting on and taking off regular lower body clothing: A lot  Bathing (including washing, rinsing, drying): A lot  Putting on and taking off regular upper body clothing: A little  Toileting, which includes using toilet, bedpan or urinal: A lot  Taking care of personal grooming such as brushing teeth: A little (sink level)  Eating Meals: None  Daily Activity - Total Score: 16      Education Documentation  No documentation found.  Education Comments  No comments found.    P t completed tolieting  with min a  for functional mobility (maneuvering wheeled walker through doorway to sit onto toliet. (In/out bathroom)Decreased safety awareness noted.Pt completed  donning /doffing pants over hips with mod a.Pt required mod cuing for tereso area hygiene . Required tactile cues/auditory cues for hand hygiene x 16 min  Goals:   Encounter Problems       Encounter Problems (Active)       OT Goals       Pt will increase Grooming to s/mod indep   Upper Body Bathing to s/mod indep     Lower Body Bathing  to s/mod indep    Increase Upper Body Dressing  to s/mod indep     LE Dressing to s/mod indep with adaptive equipment as needed (Progressing)       Start:  12/09/24    Expected End:  12/23/24            Pt will increase Functional Transfers Bed Mobility to s/mod indep    Sit to Stand  to s/mod indep    Functional Mobility  with a device to s/mod indep  chair/toliet/room to increase indep/safety in  patients discharge environment (Progressing)       Start:  12/09/24    Expected End:  12/23/24            Pt will increase cognition to x2 and direction following to  sba  with minimal prompting using auditory/visual and tactile cues to increase pt safety (Progressing)       Start:  12/09/24    Expected End:  12/23/24

## 2024-12-10 NOTE — DOCUMENTATION CLARIFICATION NOTE
"    PATIENT:               YODIT STOKES  ACCT #:                  [id number]  MRN:                       [id number]  :                       3/12/1931  ADMIT DATE:       2024 7:27 PM  DISCH DATE:  RESPONDING PROVIDER #:        [id number]          PROVIDER RESPONSE TEXT:    Sepsis with neurological organ dysfunction of encephalopathy    CDI QUERY TEXT:    Clarification    Instruction:  Based on your assessment of the patient and the clinical information, please provide the requested documentation by clicking on the appropriate radio button and enter any additional information if prompted.    Question: Is there a diagnosis indicative of a patient meeting SIRS criteria and with organ dysfunction in the setting of UTI infection    When answering this query, please exercise your independent professional judgment. The fact that a question is being asked, does not imply that any particular answer is desired or expected.    The patient's clinical indicators include:  Clinical Information: H&P:  Patient admitted with \"Febrile illness UTI\"    Clinical Indicators:   Day of admission VS (range)  Temp 35.7-37.5 HR , RR 23-27, BP /55-69  O2 sats 91-96% on RA   Labs:  WBC 11.5, ANC 10.65  Lactate 1.6  UA WBC grater than 50, 500 Leukocyte  Bacteria 3+   Urine culture: >=100,000 Escherichia coli  - Troponin range: 50-59-51-39    ED: \"altered mental status acute encephalopathy\"    H&P: \"Suspected UTI  Elevated troponin, Tachycardia Fever\"    MAR: Zosyn IV, Vancomycin IV, IVFs    Treatment: Labs, monitoring, IVfs, IV antibiotics    Risk Factors: Increased temp, increased heart rate, increased respiratory rate, elevated WBC, elevated troponin, altered mental status, infection  Options provided:  -- Sepsis with neurological organ dysfunction of encephalopathy  -- Sepsis with cardiac organ dysfunction of elevated troponin  -- Sepsis with multiple organ dysfunction of encephalopathy and elevated " troponin  -- Other - I will add my own diagnosis  -- Refer to Clinical Documentation Reviewer    Query created by: Verena Gonzalez on 12/10/2024 1:13 PM      Electronically signed by:  ELA AIKEN DO 12/10/2024 2:01 PM

## 2024-12-10 NOTE — CARE PLAN
The patient's goals for the shift include  comfort    The clinical goals for the shift include remain fall free

## 2024-12-11 ENCOUNTER — PATIENT OUTREACH (OUTPATIENT)
Dept: PRIMARY CARE | Facility: CLINIC | Age: 89
End: 2024-12-11
Payer: MEDICARE

## 2024-12-11 LAB
BACTERIA BLD CULT: NORMAL
BACTERIA BLD CULT: NORMAL

## 2024-12-11 NOTE — PROGRESS NOTES
Discharge Facility: [facility]   Discharge Diagnosis: Febrile illness; Generalized abdominal pain; Nausea and vomiting, unspecified vomiting type; Acute cystitis without hematuria   Admission Date: 12/6/2024   Discharge Date: 12/10/2024     PCP Appointment Date: TBD-office to arrange fup with PCP as needed with friend Madhuri  Specialist Appointment Date: TBD  Hospital Encounter and Summary Linked: Yes  See discharge assessment below for further details  Engagement  Call Start Time: 1413 (12/11/2024  2:20 PM)    Medications  Medications reviewed with patient/caregiver?: Yes (meds discussed with patient's GIOVANI Rufina Preston) (12/11/2024  2:20 PM)  Is the patient having any side effects they believe may be caused by any medication additions or changes?: No (12/11/2024  2:20 PM)  Does the patient have all medications ordered at discharge?: Yes (12/11/2024  2:20 PM)  Care Management Interventions: No intervention needed (12/11/2024  2:20 PM)  Prescription Comments: see med list (augmentin) (12/11/2024  2:20 PM)  Is the patient taking all medications as directed (includes completed medication regime)?: Yes (12/11/2024  2:20 PM)    Appointments  Does the patient have a primary care provider?: Yes (12/11/2024  2:20 PM)  Care Management Interventions: Educated patient on importance of making appointment; Advised patient to make appointment (12/11/2024  2:20 PM)  Has the patient kept scheduled appointments due by today?: Yes (12/11/2024  2:20 PM)    Self Management  What is the home health agency?: [facility] (12/11/2024  2:20 PM)  Has home health visited the patient within 72 hours of discharge?: Yes (12/11/2024  2:20 PM)    Patient Teaching  Does the patient have access to their discharge instructions?: Yes (12/11/2024  2:20 PM)  Care Management Interventions: Reviewed instructions with patient (12/11/2024  2:20 PM)  What is the patient's perception of their health status since discharge?: Improving (12/11/2024  2:20  PM)  Is the patient/caregiver able to teach back the hierarchy of who to call/visit for symptoms/problems? PCP, Specialist, Home Health nurse, Urgent Care, ED, 911: Yes (12/11/2024  2:20 PM)  Patient/Caregiver Education Comments: Successful transition of care outreach with the patient's healthcare POA, Rufina Johnston, with permission She reports the patient is doing well at home since discharge. New meds were reviewed with the patient POA during outreach. States the patient is not confused at home. She is managing ADLs by self like usual but does have her neighbor and friend there to assist if needed. States appts should be made with Madhuri as she would be the one to take patient. Denies further discharge questions/concerns/needs during outreach call. Emphasized that follow-up appts are needed after discharge with PCP and reviewed needed follow-ups with any specialties to assess response to treatment from hospitalization. The patient's friend is aware of my availability for non-emergent concerns. Contact information was provided to the patient's POA. (12/11/2024  2:20 PM)

## 2024-12-12 ENCOUNTER — HOME CARE VISIT (OUTPATIENT)
Dept: HOME HEALTH SERVICES | Facility: HOME HEALTH | Age: 89
End: 2024-12-12
Payer: MEDICARE

## 2024-12-12 VITALS
SYSTOLIC BLOOD PRESSURE: 132 MMHG | TEMPERATURE: 97.7 F | RESPIRATION RATE: 18 BRPM | OXYGEN SATURATION: 99 % | DIASTOLIC BLOOD PRESSURE: 74 MMHG | HEART RATE: 69 BPM

## 2024-12-12 PROCEDURE — G0299 HHS/HOSPICE OF RN EA 15 MIN: HCPCS | Mod: HHH

## 2024-12-12 PROCEDURE — G0151 HHCP-SERV OF PT,EA 15 MIN: HCPCS | Mod: HHH

## 2024-12-12 PROCEDURE — 169592 NO-PAY CLAIM PROCEDURE

## 2024-12-12 ASSESSMENT — ENCOUNTER SYMPTOMS
DENIES PAIN: 1
PERSON REPORTING PAIN: PATIENT
FATIGUE: 1
LIMITED RANGE OF MOTION: 1
LAST BOWEL MOVEMENT: 67184
DENIES PAIN: 1
FORGETFULNESS: 1
FATIGUES EASILY: 1
PERSON REPORTING PAIN: PATIENT
MUSCLE WEAKNESS: 1
HIGHEST PAIN SEVERITY IN PAST 24 HOURS: 0/10
CHANGE IN APPETITE: UNCHANGED
APPETITE LEVEL: FAIR

## 2024-12-12 ASSESSMENT — ACTIVITIES OF DAILY LIVING (ADL)
OASIS_M1830: 05
ENTERING_EXITING_HOME: MAXIMUM ASSIST

## 2024-12-12 ASSESSMENT — LIFESTYLE VARIABLES: SMOKING_STATUS: 1

## 2024-12-15 NOTE — DISCHARGE SUMMARY
Discharge Diagnosis  Febrile illness    Issues Requiring Follow-Up  Fever    Discharge Meds     Medication List      START taking these medications     amoxicillin-pot clavulanate 875-125 mg tablet; Commonly known as:   Augmentin; Take 1 tablet (875 mg) by mouth 2 times a day for 5 days.     CHANGE how you take these medications     furosemide 20 mg tablet; Commonly known as: Lasix; Take 1 tablet (20 mg)   by mouth once daily as needed (Bilater leg edema) for up to 7 days.; What   changed: Another medication with the same name was removed. Continue   taking this medication, and follow the directions you see here.   lidocaine 5 % patch; Commonly known as: Lidoderm; Place 1 patch over 12   hours on the skin once daily.; What changed: when to take this, reasons to   take this, additional instructions   nystatin 100,000 unit/gram powder; Commonly known as: Mycostatin; Apply   1 Application topically 2 times a day.; What changed: when to take this,   reasons to take this, additional instructions     CONTINUE taking these medications     levothyroxine 100 mcg tablet; Commonly known as: Synthroid, Levoxyl;   Take 1 tablet (100 mcg) by mouth once daily in the morning. Take before   meals. Take on an empty stomach   omega-3 fatty acids 500 mg capsule   Ultra-Light Rollator misc; Generic drug: walker; 1 each once daily. 4   wheel rollator with seat       Test Results Pending At Discharge  Pending Labs       No current pending labs.            Hospital Course   A 89 year old female with a past medical history of HTN, HLD, dementia, hypothyroidism, myelodysplastic syndrome, hx of bladder and skin cancer, who presented to [facility] ED today from home with abdominal pain, fever, and vomiting. UA consistent with UTI. Continue antibiotics. Patient will be hospitalized for further medical management.       UTI   Elevated troponin  Hypokalemia  Leukocytosis, mild  Abdominal pain  Nausea/vomiting  Tachycardia  Fever  Fluid collection  lateral hip  Admit to inpatient/telemetry to Dr. Francisco Washington  See imaging results above   Continue vancomycin and zosyn  Replete potassium  Tylenol PRN  1L NS given in ED. Continue NS @75ml/hr x1L.  I&Os  Follow urine and blood cultures   Trend troponin (50)  PT/OT/SW  Repeat labs in AM  Ortho consulted, appreciate recs     Chronic conditions   #HTN, HLD, dementia, hypothyroidism, myelodysplastic syndrome, hx of bladder cancer and skin cancer  Continue home meds as appropriate when med rec is completed.   Full code     DVT prophylaxis   Heparin SQ  SCD's as tolerated  Up in chair     12/8: Urine culture pending. Continue IV antibiotics. Replete potassium.     12/9: Urine culture growing Enteric bacilli. Continue IV antibiotics. Dispo pending, home with daughter vs SNF.     Discharged home with [facility]    Pertinent Physical Exam At Time of Discharge  Physical Exam  Constitutional:       Comments: Frail, elderly female who is hard of hearing (she needs to look at you and read your lips while talking)   HENT:      Head: Normocephalic and atraumatic.      Nose: Nose normal.      Mouth/Throat:      Mouth: Mucous membranes are moist.      Pharynx: Oropharynx is clear.   Eyes:      Extraocular Movements: Extraocular movements intact.      Conjunctiva/sclera: Conjunctivae normal.      Pupils: Pupils are equal, round, and reactive to light.   Cardiovascular:      Rate and Rhythm: Tachycardia present. Rhythm irregular.      Pulses: Normal pulses.      Heart sounds: Murmur heard.   Pulmonary:      Effort: Pulmonary effort is normal.      Breath sounds: Normal breath sounds.   Abdominal:      General: Abdomen is flat. Bowel sounds are normal.      Palpations: Abdomen is soft.      Tenderness: There is abdominal tenderness.      Comments: Tenderness to RLQ with palpation   Musculoskeletal:         General: Normal range of motion.      Cervical back: Normal range of motion and neck supple.      Comments: Non-tender hard mass  to left hip on palpation.   Skin:     General: Skin is warm and dry.      Capillary Refill: Capillary refill takes less than 2 seconds.   Neurological:      General: No focal deficit present.      Mental Status: She is alert. Mental status is at baseline.      Outpatient Follow-Up  Future Appointments   Date Time Provider Department Center   12/16/2024 To Be Determined Augusta Gutierrez, OT [facility]   12/17/2024 To Be Determined Carin Caballero, [facility]   12/18/2024 To Be Determined MAYA EnglishW [facility]   12/18/2024  1:00 PM Belia Portillo, [facility]   12/19/2024 To Be Determined Carin Caballero, [facility]   12/23/2024 To Be Determined Carin Caballero, [facility]   12/26/2024 To Be Determined Iman Andrea RN [facility]   12/30/2024 To Be Determined Carin Caballero, [facility]   12/31/2024 To Be Determined Belia Portillo, [facility]   1/7/2025 To Be Determined Noah Marshall, PT [facility]   1/8/2025 To Be Determined Belia Vicc, [facility]   1/15/2025 To Be Determined Belia Hoc, [facility]   1/22/2025 To Be Determined Belia Vicc, [facility]   1/30/2025 To Be Determined Iman Andrea RN [facility]         Francisco Washington DO

## 2024-12-16 ENCOUNTER — HOME CARE VISIT (OUTPATIENT)
Dept: HOME HEALTH SERVICES | Facility: HOME HEALTH | Age: 89
End: 2024-12-16
Payer: MEDICARE

## 2024-12-16 VITALS — DIASTOLIC BLOOD PRESSURE: 60 MMHG | HEART RATE: 92 BPM | SYSTOLIC BLOOD PRESSURE: 90 MMHG | OXYGEN SATURATION: 96 %

## 2024-12-16 PROCEDURE — G0152 HHCP-SERV OF OT,EA 15 MIN: HCPCS | Mod: HHH

## 2024-12-16 ASSESSMENT — ACTIVITIES OF DAILY LIVING (ADL)
TOILETING: 1
DRESSING_UB_CURRENT_FUNCTION: STAND BY ASSIST
TOILETING: STAND BY ASSIST
BATHING_CURRENT_FUNCTION: MODERATE ASSIST
BATHING ASSESSED: 1
DRESSING_LB_CURRENT_FUNCTION: STAND BY ASSIST

## 2024-12-16 ASSESSMENT — ENCOUNTER SYMPTOMS
PERSON REPORTING PAIN: PATIENT
DENIES PAIN: 1

## 2024-12-16 NOTE — DOCUMENTATION CLARIFICATION NOTE
"    PATIENT:               YODIT STOKES  ACCT #:                  [id number]  MRN:                       [id number]  :                       3/12/1931  ADMIT DATE:       2024 7:27 PM  DISCH DATE:        12/10/2024 7:33 PM  RESPONDING PROVIDER #:        [id number]          PROVIDER RESPONSE TEXT:    Metabolic Encephalopathy 2/2 Sepsis    CDI QUERY TEXT:    Clarification    Instruction:    Based on your assessment of the patient and the clinical information, please provide the requested documentation by clicking on the appropriate radio button and enter any additional information if prompted.    Question: Please further clarify the type of Encephalopathy as    When answering this query, please exercise your independent professional judgment. The fact that a question is being asked, does not imply that any particular answer is desired or expected.    The patient's clinical indicators include:  Clinical Information: ED: \"altered mental status acute encephalopathy\"    Clinical Indicators:  H&P: \"Suspected UTI  Elevated troponin, Tachycardia Fever\"  : IM note: \"Patient seen and examined at bedside. She is alert and oriented to name only. She has a sitter.\"  12/10 Query response: \"Sepsis with neurological organ dysfunction of encephalopathy\"    MAR: Zosyn IV, Vancomycin IV, IVFs    Treatment: Monitoring, IV abx, IVFs    Risk Factors: Age, confusion, infection, sitter  Options provided:  -- Metabolic Encephalopathy 2/2 Sepsis  -- Other - I will add my own diagnosis  -- Refer to Clinical Documentation Reviewer    Query created by: Verena Gonzalez on 2024 2:20 PM      Electronically signed by:  ELA AIKEN DO 2024 7:51 AM          "

## 2024-12-17 ENCOUNTER — HOME CARE VISIT (OUTPATIENT)
Dept: HOME HEALTH SERVICES | Facility: HOME HEALTH | Age: 89
End: 2024-12-17
Payer: MEDICARE

## 2024-12-17 PROCEDURE — G0157 HHC PT ASSISTANT EA 15: HCPCS | Mod: CQ,HHH

## 2024-12-17 ASSESSMENT — ENCOUNTER SYMPTOMS
DENIES PAIN: 1
PERSON REPORTING PAIN: PATIENT

## 2024-12-18 ENCOUNTER — HOME CARE VISIT (OUTPATIENT)
Dept: HOME HEALTH SERVICES | Facility: HOME HEALTH | Age: 89
End: 2024-12-18
Payer: MEDICARE

## 2024-12-18 VITALS
OXYGEN SATURATION: 95 % | RESPIRATION RATE: 18 BRPM | HEART RATE: 88 BPM | TEMPERATURE: 97.5 F | DIASTOLIC BLOOD PRESSURE: 60 MMHG | SYSTOLIC BLOOD PRESSURE: 98 MMHG

## 2024-12-18 PROCEDURE — G0300 HHS/HOSPICE OF LPN EA 15 MIN: HCPCS | Mod: HHH

## 2024-12-18 ASSESSMENT — ENCOUNTER SYMPTOMS
DESCRIPTION OF MEMORY LOSS: SHORT TERM
DRY SKIN: 1
APPETITE LEVEL: GOOD
BOWEL PATTERN NORMAL: 1
LAST BOWEL MOVEMENT: 67192
STOOL FREQUENCY: LESS THAN DAILY
MUSCLE WEAKNESS: 1
FORGETFULNESS: 1
FATIGUE: 1
FATIGUES EASILY: 1
CHANGE IN APPETITE: UNCHANGED

## 2024-12-18 ASSESSMENT — PAIN SCALES - PAIN ASSESSMENT IN ADVANCED DEMENTIA (PAINAD)
BODYLANGUAGE: 0
CONSOLABILITY: 0
NEGVOCALIZATION: 0
CONSOLABILITY: 0 - NO NEED TO CONSOLE.
BODYLANGUAGE: 0 - RELAXED.
NEGVOCALIZATION: 0 - NONE.
TOTALSCORE: 0
FACIALEXPRESSION: 0 - SMILING OR INEXPRESSIVE.
BREATHING: 0
FACIALEXPRESSION: 0

## 2024-12-18 ASSESSMENT — ACTIVITIES OF DAILY LIVING (ADL): MONEY MANAGEMENT (EXPENSES/BILLS): TOTALLY DEPENDENT

## 2024-12-18 NOTE — HOME HEALTH
Patient seen today for routine SN visit. Paid CG present for visit. Patient in good spirits, sitting up on couch. All VS WNL. Lung sounds CTA. Patient denies any pain or discomfort at this time. Patient and CG deny any issues moving bowels or voiding. Appetite varied from fair to good, she also supplements meals with 2-3 Boost nutritional shakes daily. Patient ambulates throughout home with assistance of walker. No changes in medications at this time.

## 2024-12-19 ENCOUNTER — HOME CARE VISIT (OUTPATIENT)
Dept: HOME HEALTH SERVICES | Facility: HOME HEALTH | Age: 89
End: 2024-12-19
Payer: MEDICARE

## 2024-12-19 PROCEDURE — G0157 HHC PT ASSISTANT EA 15: HCPCS | Mod: CQ,HHH

## 2024-12-19 ASSESSMENT — ENCOUNTER SYMPTOMS
PERSON REPORTING PAIN: PATIENT
DENIES PAIN: 1

## 2024-12-20 ENCOUNTER — HOME CARE VISIT (OUTPATIENT)
Dept: HOME HEALTH SERVICES | Facility: HOME HEALTH | Age: 89
End: 2024-12-20
Payer: MEDICARE

## 2024-12-20 PROCEDURE — G0155 HHCP-SVS OF CSW,EA 15 MIN: HCPCS | Mod: HHH

## 2024-12-21 ASSESSMENT — ACTIVITIES OF DAILY LIVING (ADL)
BATHING_REQUIRES_ASSISTANCE: 1
SHOPPING_REQUIRES_ASSISTANCE: 1
LAUNDRY_REQUIRES_ASSISTANCE: 1

## 2024-12-24 ENCOUNTER — HOME CARE VISIT (OUTPATIENT)
Dept: HOME HEALTH SERVICES | Facility: HOME HEALTH | Age: 89
End: 2024-12-24
Payer: MEDICARE

## 2024-12-25 ENCOUNTER — HOME CARE VISIT (OUTPATIENT)
Dept: HOME HEALTH SERVICES | Facility: HOME HEALTH | Age: 89
End: 2024-12-25
Payer: MEDICARE

## 2024-12-25 VITALS
HEART RATE: 82 BPM | TEMPERATURE: 97.5 F | DIASTOLIC BLOOD PRESSURE: 60 MMHG | OXYGEN SATURATION: 96 % | SYSTOLIC BLOOD PRESSURE: 102 MMHG | RESPIRATION RATE: 16 BRPM

## 2024-12-25 PROCEDURE — G0300 HHS/HOSPICE OF LPN EA 15 MIN: HCPCS | Mod: HHH

## 2024-12-25 ASSESSMENT — ENCOUNTER SYMPTOMS
DESCRIPTION OF MEMORY LOSS: SHORT TERM
SUBJECTIVE PAIN PROGRESSION: UNCHANGED
HIGHEST PAIN SEVERITY IN PAST 24 HOURS: 0/10
FORGETFULNESS: 1
LOWEST PAIN SEVERITY IN PAST 24 HOURS: 0/10
MUSCLE WEAKNESS: 1
BOWEL PATTERN NORMAL: 1
FATIGUE: 1
DENIES PAIN: 1
PERSON REPORTING PAIN: PATIENT
PAIN SEVERITY GOAL: 0/10
FATIGUES EASILY: 1
APPETITE LEVEL: FAIR
DRY SKIN: 1
LAST BOWEL MOVEMENT: 67198
CHANGE IN APPETITE: UNCHANGED

## 2024-12-25 ASSESSMENT — PAIN SCALES - PAIN ASSESSMENT IN ADVANCED DEMENTIA (PAINAD)
FACIALEXPRESSION: 0
CONSOLABILITY: 0
TOTALSCORE: 0
BODYLANGUAGE: 0 - RELAXED.
NEGVOCALIZATION: 0
CONSOLABILITY: 0 - NO NEED TO CONSOLE.
FACIALEXPRESSION: 0 - SMILING OR INEXPRESSIVE.
BREATHING: 0
BODYLANGUAGE: 0
NEGVOCALIZATION: 0 - NONE.

## 2024-12-25 ASSESSMENT — ACTIVITIES OF DAILY LIVING (ADL): MONEY MANAGEMENT (EXPENSES/BILLS): TOTALLY DEPENDENT

## 2024-12-25 NOTE — HOME HEALTH
Patient up and dressed, eating breakfast with caregiver and in good spirits. Patient denies any pain or discomfort. Patient was able to answer the majority of assessment questions without assistance from caregiver. All vital signs remain within limits. Per caregiver, no issues moving bowels or voiding. Patient is continent of bowel and bladder, caregiver did purchase a raised toilet seat. Appetite remains good. no changes in medication at this time, will provide a medication box for caregiver at next visit.

## 2024-12-26 ENCOUNTER — HOME CARE VISIT (OUTPATIENT)
Dept: HOME HEALTH SERVICES | Facility: HOME HEALTH | Age: 89
End: 2024-12-26
Payer: MEDICARE

## 2024-12-26 ENCOUNTER — PATIENT OUTREACH (OUTPATIENT)
Dept: PRIMARY CARE | Facility: CLINIC | Age: 89
End: 2024-12-26
Payer: MEDICARE

## 2024-12-26 NOTE — PROGRESS NOTES
"Call regarding after hospitalization.  At time of outreach call the patient feels as if their condition has (improved) since last visit. Spoke to patient's caregiver with permission. Reviewed patient's labwork and imaging. Provided Dr. Ashraf name who saw patient in the hospital for orthopedics consultation. States patient is doing \"really well\" at this point.    "

## 2024-12-31 ENCOUNTER — HOME CARE VISIT (OUTPATIENT)
Dept: HOME HEALTH SERVICES | Facility: HOME HEALTH | Age: 89
End: 2024-12-31
Payer: MEDICARE

## 2024-12-31 PROCEDURE — G0157 HHC PT ASSISTANT EA 15: HCPCS | Mod: CQ,HHH

## 2024-12-31 ASSESSMENT — ENCOUNTER SYMPTOMS
DENIES PAIN: 1
PERSON REPORTING PAIN: PATIENT

## 2025-01-01 ENCOUNTER — HOME CARE VISIT (OUTPATIENT)
Dept: HOME HEALTH SERVICES | Facility: HOME HEALTH | Age: OVER 89
End: 2025-01-01
Payer: MEDICARE

## 2025-01-01 ASSESSMENT — ENCOUNTER SYMPTOMS
CHANGE IN APPETITE: UNCHANGED
APPETITE LEVEL: GOOD
LAST BOWEL MOVEMENT: 67205
FATIGUE: 1
FORGETFULNESS: 1
FATIGUES EASILY: 1

## 2025-01-08 ENCOUNTER — HOME CARE VISIT (OUTPATIENT)
Dept: HOME HEALTH SERVICES | Facility: HOME HEALTH | Age: OVER 89
End: 2025-01-08
Payer: MEDICARE

## 2025-01-08 PROCEDURE — G0151 HHCP-SERV OF PT,EA 15 MIN: HCPCS | Mod: HHH

## 2025-01-08 ASSESSMENT — ACTIVITIES OF DAILY LIVING (ADL)
HOME_HEALTH_OASIS: 01
OASIS_M1830: 03

## 2025-01-16 ENCOUNTER — LAB (OUTPATIENT)
Dept: LAB | Facility: LAB | Age: OVER 89
End: 2025-01-16
Payer: MEDICARE

## 2025-01-16 DIAGNOSIS — N39.0 UTI (URINARY TRACT INFECTION): Primary | ICD-10-CM

## 2025-01-16 PROCEDURE — 87086 URINE CULTURE/COLONY COUNT: CPT

## 2025-01-17 LAB — BACTERIA UR CULT: NORMAL
